# Patient Record
Sex: MALE | Race: WHITE | NOT HISPANIC OR LATINO | Employment: OTHER | URBAN - METROPOLITAN AREA
[De-identification: names, ages, dates, MRNs, and addresses within clinical notes are randomized per-mention and may not be internally consistent; named-entity substitution may affect disease eponyms.]

---

## 2019-05-07 DIAGNOSIS — I48.91 ATRIAL FIBRILLATION, UNSPECIFIED TYPE (HCC): Primary | ICD-10-CM

## 2019-05-09 ENCOUNTER — HOSPITAL ENCOUNTER (OUTPATIENT)
Dept: CARDIOLOGY | Facility: HOSPITAL | Age: 69
Discharge: HOME | End: 2019-05-09
Attending: INTERNAL MEDICINE | Admitting: INTERNAL MEDICINE

## 2019-05-09 VITALS
DIASTOLIC BLOOD PRESSURE: 81 MMHG | HEART RATE: 80 BPM | OXYGEN SATURATION: 92 % | SYSTOLIC BLOOD PRESSURE: 117 MMHG | RESPIRATION RATE: 16 BRPM | TEMPERATURE: 97 F

## 2019-05-09 DIAGNOSIS — I48.91 ATRIAL FIBRILLATION, UNSPECIFIED TYPE (HCC): ICD-10-CM

## 2019-05-09 DIAGNOSIS — I48.3 TYPICAL ATRIAL FLUTTER (HCC): ICD-10-CM

## 2019-05-09 DIAGNOSIS — I48.0 PAROXYSMAL ATRIAL FIBRILLATION (HCC): Primary | ICD-10-CM

## 2019-05-09 PROBLEM — R06.83 SNORING: Status: ACTIVE | Noted: 2019-05-09

## 2019-05-09 PROBLEM — I10 ESSENTIAL HYPERTENSION: Status: ACTIVE | Noted: 2019-05-09

## 2019-05-09 PROBLEM — N40.0 BPH (BENIGN PROSTATIC HYPERPLASIA): Status: ACTIVE | Noted: 2019-05-09

## 2019-05-09 PROBLEM — E78.5 HYPERLIPIDEMIA LDL GOAL <100: Status: ACTIVE | Noted: 2019-05-09

## 2019-05-09 PROBLEM — I48.19 ATRIAL FIBRILLATION, PERSISTENT (HCC): Status: ACTIVE | Noted: 2019-05-09

## 2019-05-09 LAB
ANION GAP SERPL CALCULATED.3IONS-SCNC: 7 MMOL/L
BUN BLD-MCNC: 12 MG/DL (ref 8–23)
BUN/CREAT SERPL: 11.8 (ref 7–25)
CALCIUM SPEC-SCNC: 8.4 MG/DL (ref 8.6–10.5)
CHLORIDE SERPL-SCNC: 105 MMOL/L (ref 98–107)
CO2 SERPL-SCNC: 28 MMOL/L (ref 22–29)
CREAT BLD-MCNC: 1.02 MG/DL (ref 0.76–1.27)
DEPRECATED RDW RBC AUTO: 43.5 FL (ref 37–54)
ERYTHROCYTE [DISTWIDTH] IN BLOOD BY AUTOMATED COUNT: 13.5 % (ref 12.3–15.4)
GFR SERPL CREATININE-BSD FRML MDRD: 72 ML/MIN/1.73
GLUCOSE BLD-MCNC: 139 MG/DL (ref 65–99)
HCT VFR BLD AUTO: 44.7 % (ref 37.5–51)
HGB BLD-MCNC: 14.6 G/DL (ref 13–17.7)
MCH RBC QN AUTO: 28.9 PG (ref 26.6–33)
MCHC RBC AUTO-ENTMCNC: 32.7 G/DL (ref 31.5–35.7)
MCV RBC AUTO: 88.5 FL (ref 79–97)
PLATELET # BLD AUTO: 209 10*3/MM3 (ref 140–450)
PMV BLD AUTO: 10.3 FL (ref 6–12)
POTASSIUM BLD-SCNC: 3.9 MMOL/L (ref 3.5–5.2)
RBC # BLD AUTO: 5.05 10*6/MM3 (ref 4.14–5.8)
SODIUM BLD-SCNC: 140 MMOL/L (ref 136–145)
WBC NRBC COR # BLD: 5.16 10*3/MM3 (ref 3.4–10.8)

## 2019-05-09 PROCEDURE — 85027 COMPLETE CBC AUTOMATED: CPT | Performed by: INTERNAL MEDICINE

## 2019-05-09 PROCEDURE — S0260 H&P FOR SURGERY: HCPCS | Performed by: INTERNAL MEDICINE

## 2019-05-09 PROCEDURE — 93005 ELECTROCARDIOGRAM TRACING: CPT | Performed by: NURSE PRACTITIONER

## 2019-05-09 PROCEDURE — 92960 CARDIOVERSION ELECTRIC EXT: CPT

## 2019-05-09 PROCEDURE — 93005 ELECTROCARDIOGRAM TRACING: CPT | Performed by: INTERNAL MEDICINE

## 2019-05-09 PROCEDURE — 92960 CARDIOVERSION ELECTRIC EXT: CPT | Performed by: INTERNAL MEDICINE

## 2019-05-09 PROCEDURE — 36415 COLL VENOUS BLD VENIPUNCTURE: CPT

## 2019-05-09 PROCEDURE — 80048 BASIC METABOLIC PNL TOTAL CA: CPT | Performed by: INTERNAL MEDICINE

## 2019-05-09 PROCEDURE — 99152 MOD SED SAME PHYS/QHP 5/>YRS: CPT

## 2019-05-09 PROCEDURE — 25010000002 MIDAZOLAM PER 1 MG: Performed by: INTERNAL MEDICINE

## 2019-05-09 RX ORDER — FINASTERIDE 5 MG/1
5 TABLET, FILM COATED ORAL EVERY MORNING
COMMUNITY
End: 2019-08-29

## 2019-05-09 RX ORDER — NALOXONE HYDROCHLORIDE 0.4 MG/ML
INJECTION, SOLUTION INTRAMUSCULAR; INTRAVENOUS; SUBCUTANEOUS
Status: DISCONTINUED
Start: 2019-05-09 | End: 2019-05-09 | Stop reason: WASHOUT

## 2019-05-09 RX ORDER — LOSARTAN POTASSIUM 25 MG/1
25 TABLET ORAL EVERY MORNING
COMMUNITY
End: 2023-01-17 | Stop reason: DRUGHIGH

## 2019-05-09 RX ORDER — MIDAZOLAM HYDROCHLORIDE 1 MG/ML
INJECTION INTRAMUSCULAR; INTRAVENOUS
Status: COMPLETED | OUTPATIENT
Start: 2019-05-09 | End: 2019-05-09

## 2019-05-09 RX ORDER — TAMSULOSIN HYDROCHLORIDE 0.4 MG/1
1 CAPSULE ORAL EVERY MORNING
COMMUNITY
End: 2019-08-29

## 2019-05-09 RX ORDER — FLUMAZENIL 0.1 MG/ML
INJECTION INTRAVENOUS
Status: DISCONTINUED
Start: 2019-05-09 | End: 2019-05-09 | Stop reason: WASHOUT

## 2019-05-09 RX ORDER — FUROSEMIDE 20 MG/1
20 TABLET ORAL EVERY MORNING
COMMUNITY
End: 2021-12-13

## 2019-05-09 RX ORDER — MIDAZOLAM HYDROCHLORIDE 1 MG/ML
INJECTION INTRAMUSCULAR; INTRAVENOUS
Status: DISCONTINUED
Start: 2019-05-09 | End: 2019-05-09 | Stop reason: HOSPADM

## 2019-05-09 RX ORDER — OMEPRAZOLE 20 MG/1
20 CAPSULE, DELAYED RELEASE ORAL DAILY
COMMUNITY
End: 2019-05-23 | Stop reason: SDUPTHER

## 2019-05-09 RX ORDER — SIMVASTATIN 40 MG
40 TABLET ORAL EVERY MORNING
COMMUNITY

## 2019-05-09 RX ORDER — FLECAINIDE ACETATE 100 MG/1
100 TABLET ORAL 2 TIMES DAILY
Status: ON HOLD | COMMUNITY
End: 2019-05-30

## 2019-05-09 RX ORDER — FENTANYL CITRATE 50 UG/ML
INJECTION, SOLUTION INTRAMUSCULAR; INTRAVENOUS
Status: DISCONTINUED
Start: 2019-05-09 | End: 2019-05-09 | Stop reason: WASHOUT

## 2019-05-09 RX ORDER — DILTIAZEM HYDROCHLORIDE 300 MG/1
360 CAPSULE, COATED, EXTENDED RELEASE ORAL DAILY
Status: ON HOLD | COMMUNITY
End: 2019-05-30

## 2019-05-09 RX ADMIN — METHOHEXITAL SODIUM 20 MG: 500 INJECTION, POWDER, LYOPHILIZED, FOR SOLUTION INTRAMUSCULAR; INTRAVENOUS; RECTAL at 11:25

## 2019-05-09 RX ADMIN — METHOHEXITAL SODIUM 30 MG: 500 INJECTION, POWDER, LYOPHILIZED, FOR SOLUTION INTRAMUSCULAR; INTRAVENOUS; RECTAL at 11:23

## 2019-05-09 RX ADMIN — MIDAZOLAM HYDROCHLORIDE 2 MG: 1 INJECTION, SOLUTION INTRAMUSCULAR; INTRAVENOUS at 11:24

## 2019-05-09 NOTE — H&P
[facility] at [facility]  Cardiovascular History and Physical note    Reason for visit: Symptomatic atrial flutter       The patient is a 68-year-old gentleman with a history of paroxysmal atrial fibrillation originally diagnosed in 2017.  The patient previously was followed at [facility] by Dr. Meredith.  He underwent cardioversion sometime near that diagnosis date.  He was seen by EP at [facility] and there was some discussion of pulmonary vein ablation.  The patient has been maintained on flecainide and diltiazem after undergoing a reportedly normal nuclear stress test.  The patient has had symptomatic recurrence of atrial flutter several times.  His symptoms include fatigue and some mild palpitations symptoms in his neck which are not always present.  He states that over the last 3 weeks he has felt the fatigue symptoms and went to see his primary care provider, Dr. Hakan Pena, who diagnosed him with typical atrial flutter.  The patient is open to consideration of escalation of medical therapy to dofetilide and/or pulmonary vein ablation.  He has decided to transfer his cardiology care to [facility].      Past medical and surgical history, social and family history reviewed in EMR.    REVIEW OF SYSTEMS:   H&P ROS reviewed and pertinent CV ROS as noted in HPI.         Vital Sign Min/Max for last 24 hours  Temp  Min: 97 °F (36.1 °C)  Max: 97 °F (36.1 °C)   BP  Min: 110/80  Max: 131/83   Pulse  Min: 71  Max: 84   Resp  Min: 16  Max: 16   SpO2  Min: 92 %  Max: 97 %   No Data Recorded    No intake or output data in the 24 hours ending 05/09/19 1397        Physical Exam   Constitutional: He is oriented to person, place, and time. He appears well-developed and well-nourished.   HENT:   Head: Normocephalic and atraumatic.   Eyes: Pupils are equal, round, and reactive to light. No scleral icterus.   Neck: No JVD present. Carotid bruit is not present. No thyromegaly present.  "  Cardiovascular: Normal rate and regular rhythm. Exam reveals no gallop.   No murmur heard.  Pulmonary/Chest: Effort normal and breath sounds normal.   Abdominal: Soft. He exhibits no distension. There is no hepatosplenomegaly.   Musculoskeletal: He exhibits no edema.   Neurological: He is alert and oriented to person, place, and time.   Skin: Skin is warm and dry.   Psychiatric: He has a normal mood and affect. His behavior is normal.       EKG (5/9/2019): Atrial flutter with a ventricular rate of 67 bpm.  Typical pattern with upgoing flutter waves in V1 and downgoing flutter waves in 3 and aVF.    Lab Review:   Labs reviewed in the electronic medical record.  Pertinent findings include:  Lab Results   Component Value Date    GLUCOSE 139 (H) 05/09/2019    BUN 12 05/09/2019    CREATININE 1.02 05/09/2019    EGFRIFNONA 72 05/09/2019    BCR 11.8 05/09/2019    K 3.9 05/09/2019    CO2 28.0 05/09/2019    CALCIUM 8.4 (L) 05/09/2019    ALBUMIN 4.4 04/15/2015    AST 39 (H) 04/15/2015    ALT 54 (H) 04/15/2015     Lab Results   Component Value Date    WBC 5.16 05/09/2019    HGB 14.6 05/09/2019    HCT 44.7 05/09/2019    MCV 88.5 05/09/2019     05/09/2019     Lab data obtained 5/7/2019: WBC 6.6, hemoglobin 15.1, hematocrit 44.9, platelets 241, TSH 2.54, T4 1.34, BUN 16, creatinine 1.13, sodium 142, potassium 4.2, BNP 13.5       Active Hospital Problems    Diagnosis   • **Typical atrial flutter (CMS/HCC)     · Noted during office visit with Dr. Hakan Pena on 5/7/2019  · CHADS Vasc = 2 (age, HTN)      • Paroxysmal atrial fibrillation (CMS/HCC)     · Diagnosed in 2017  · Status post cardioversion by Dr. Meredith at [facility]  · Nuclear stress (2018): Reportedly normal.  · Chads Vasc = 2 (HTN, Age)  · Rhythm control strategy with flecainide and diltiazem     • Essential hypertension   • Hyperlipidemia LDL goal <100   • BPH (benign prostatic hyperplasia)     · Managed by Dr. Jack  · We will needs a \"saddle procedure\" once cleared " by cardiology  · Reports questionable bladder cancer       68-year-old gentleman with a history of atrial fibrillation who has had 3 weeks of fatigue symptoms and diagnosed with typical atrial flutter with controlled ventricular rate.  The patient has been on Eliquis uninterrupted for >4 weeks.       · Proceed with external cardioversion  · Outpatient referral to Dr. Dean Garrison for consideration of dofetilide versus PVA/isthmus dependent flutter ablation  · PCP will arrange outpatient sleep study    Harrison Aguirre IV, MD  5/9/2019

## 2019-05-21 ENCOUNTER — TELEPHONE (OUTPATIENT)
Dept: CARDIOLOGY | Facility: CLINIC | Age: 69
End: 2019-05-21

## 2019-05-21 NOTE — TELEPHONE ENCOUNTER
I received a call from the patient's primary care physician, Hakan Pena, who states that he saw Ray in the office and he is back in atrial flutter.  After cardioversion last week, he felt well for several days and then his flutter returned and he became symptomatic once again.    He reportedly has a history of atrial fibrillation, but I have not seen this documented.    I contacted the patient today to let him know that he is scheduled to see Dr. Mercedes on the 28th at 8 AM.

## 2019-05-23 ENCOUNTER — TRANSCRIBE ORDERS (OUTPATIENT)
Dept: PULMONOLOGY | Facility: HOSPITAL | Age: 69
End: 2019-05-23

## 2019-05-23 DIAGNOSIS — G47.33 OBSTRUCTIVE SLEEP APNEA (ADULT) (PEDIATRIC): Primary | ICD-10-CM

## 2019-05-23 RX ORDER — OMEPRAZOLE 20 MG/1
20 CAPSULE, DELAYED RELEASE ORAL EVERY MORNING
COMMUNITY

## 2019-05-23 RX ORDER — DILTIAZEM HYDROCHLORIDE 360 MG/1
360 CAPSULE, EXTENDED RELEASE ORAL DAILY
COMMUNITY
End: 2019-05-28

## 2019-05-28 ENCOUNTER — CONSULT (OUTPATIENT)
Dept: CARDIOLOGY | Facility: CLINIC | Age: 69
End: 2019-05-28

## 2019-05-28 ENCOUNTER — APPOINTMENT (OUTPATIENT)
Dept: PREADMISSION TESTING | Facility: HOSPITAL | Age: 69
End: 2019-05-28

## 2019-05-28 VITALS
BODY MASS INDEX: 37.65 KG/M2 | HEIGHT: 72 IN | SYSTOLIC BLOOD PRESSURE: 122 MMHG | DIASTOLIC BLOOD PRESSURE: 80 MMHG | WEIGHT: 278 LBS | HEART RATE: 72 BPM

## 2019-05-28 DIAGNOSIS — I48.3 TYPICAL ATRIAL FLUTTER (HCC): Primary | ICD-10-CM

## 2019-05-28 DIAGNOSIS — I48.3 TYPICAL ATRIAL FLUTTER (HCC): ICD-10-CM

## 2019-05-28 LAB
ALBUMIN SERPL-MCNC: 4.3 G/DL (ref 3.5–5.2)
ALBUMIN/GLOB SERPL: 1.4 G/DL
ALP SERPL-CCNC: 65 U/L (ref 39–117)
ALT SERPL W P-5'-P-CCNC: 17 U/L (ref 1–41)
ANION GAP SERPL CALCULATED.3IONS-SCNC: 10 MMOL/L
AST SERPL-CCNC: 16 U/L (ref 1–40)
BILIRUB SERPL-MCNC: 0.6 MG/DL (ref 0.2–1.2)
BUN BLD-MCNC: 11 MG/DL (ref 8–23)
BUN/CREAT SERPL: 12.4 (ref 7–25)
CALCIUM SPEC-SCNC: 8.9 MG/DL (ref 8.6–10.5)
CHLORIDE SERPL-SCNC: 100 MMOL/L (ref 98–107)
CO2 SERPL-SCNC: 28 MMOL/L (ref 22–29)
CREAT BLD-MCNC: 0.89 MG/DL (ref 0.76–1.27)
DEPRECATED RDW RBC AUTO: 43.5 FL (ref 37–54)
ERYTHROCYTE [DISTWIDTH] IN BLOOD BY AUTOMATED COUNT: 13.6 % (ref 12.3–15.4)
GFR SERPL CREATININE-BSD FRML MDRD: 85 ML/MIN/1.73
GLOBULIN UR ELPH-MCNC: 3.1 GM/DL
GLUCOSE BLD-MCNC: 137 MG/DL (ref 65–99)
HCT VFR BLD AUTO: 46 % (ref 37.5–51)
HGB BLD-MCNC: 15.3 G/DL (ref 13–17.7)
MCH RBC QN AUTO: 29.1 PG (ref 26.6–33)
MCHC RBC AUTO-ENTMCNC: 33.3 G/DL (ref 31.5–35.7)
MCV RBC AUTO: 87.6 FL (ref 79–97)
PLATELET # BLD AUTO: 265 10*3/MM3 (ref 140–450)
PMV BLD AUTO: 9.6 FL (ref 6–12)
POTASSIUM BLD-SCNC: 3.8 MMOL/L (ref 3.5–5.2)
PROT SERPL-MCNC: 7.4 G/DL (ref 6–8.5)
RBC # BLD AUTO: 5.25 10*6/MM3 (ref 4.14–5.8)
SODIUM BLD-SCNC: 138 MMOL/L (ref 136–145)
WBC NRBC COR # BLD: 5.8 10*3/MM3 (ref 3.4–10.8)

## 2019-05-28 PROCEDURE — 36415 COLL VENOUS BLD VENIPUNCTURE: CPT

## 2019-05-28 PROCEDURE — 93000 ELECTROCARDIOGRAM COMPLETE: CPT | Performed by: INTERNAL MEDICINE

## 2019-05-28 PROCEDURE — 99204 OFFICE O/P NEW MOD 45 MIN: CPT | Performed by: INTERNAL MEDICINE

## 2019-05-28 PROCEDURE — 85027 COMPLETE CBC AUTOMATED: CPT | Performed by: INTERNAL MEDICINE

## 2019-05-28 PROCEDURE — 80053 COMPREHEN METABOLIC PANEL: CPT | Performed by: INTERNAL MEDICINE

## 2019-05-28 RX ORDER — GUAIFENESIN 600 MG/1
600 TABLET, EXTENDED RELEASE ORAL 2 TIMES DAILY
COMMUNITY
End: 2021-04-09

## 2019-05-28 RX ORDER — SILDENAFIL CITRATE 20 MG/1
TABLET ORAL EVERY 8 HOURS SCHEDULED
COMMUNITY
End: 2019-05-28

## 2019-05-28 RX ORDER — LOSARTAN POTASSIUM AND HYDROCHLOROTHIAZIDE 12.5; 5 MG/1; MG/1
TABLET ORAL DAILY
COMMUNITY
Start: 2014-01-10 | End: 2019-05-28

## 2019-05-28 RX ORDER — NORTRIPTYLINE HYDROCHLORIDE 10 MG/1
CAPSULE ORAL
COMMUNITY
Start: 2014-01-14 | End: 2019-05-28

## 2019-05-28 RX ORDER — TIZANIDINE 4 MG/1
1 TABLET ORAL 2 TIMES DAILY
COMMUNITY
Start: 2014-08-05 | End: 2019-05-28

## 2019-05-28 RX ORDER — GUAIFENESIN 600 MG/1
1200 TABLET, EXTENDED RELEASE ORAL 2 TIMES DAILY
COMMUNITY
End: 2019-05-28

## 2019-05-28 RX ORDER — MELOXICAM 7.5 MG/1
TABLET ORAL
COMMUNITY
Start: 2014-02-06 | End: 2019-05-28

## 2019-05-28 RX ORDER — CYCLOBENZAPRINE HCL 10 MG
TABLET ORAL EVERY 8 HOURS SCHEDULED
COMMUNITY
End: 2019-05-28

## 2019-05-28 RX ORDER — HYDROCHLOROTHIAZIDE 50 MG/1
TABLET ORAL DAILY
Status: ON HOLD | COMMUNITY
Start: 2014-01-10 | End: 2019-05-30

## 2019-05-28 RX ORDER — AMLODIPINE BESYLATE 10 MG/1
10 TABLET ORAL DAILY
COMMUNITY
Start: 2014-01-10 | End: 2019-05-28

## 2019-05-28 RX ORDER — FLUTICASONE PROPIONATE 50 MCG
2 SPRAY, SUSPENSION (ML) NASAL 2 TIMES DAILY PRN
COMMUNITY
End: 2019-06-11

## 2019-05-28 NOTE — PROGRESS NOTES
Elvin Smith  1950  PCP: Hakan Pena MD    SUBJECTIVE:   Elvin Smith is a 68 y.o. male seen for a consultation visit regarding the following:     Chief Complaint:   Chief Complaint   Patient presents with   • Atrial Fibrillation   • Atrial Flutter          Consultation is requested by Harrison Murcia* for evaluation of Atrial Fibrillation and Atrial Flutter        History:  The patient is a 68-year-old patient with a history of paroxysmal atrial fibrillation originally diagnosed in 2017. He was referred for further A. Fib and flutter management. The patient previously was followed at [facility] by Dr. Meredith.  He underwent cardioversion sometime near that diagnosis date.  He was seen by EP at [facility] and there was some discussion of pulmonary vein ablation.  The patient has been maintained on flecainide and diltiazem after undergoing a reportedly normal nuclear stress test.  The patient has had symptomatic recurrence of atrial flutter several times.  His symptoms include fatigue and some mild palpitations symptoms in his neck which are not always present.    Patient also reports that he is severely short of breath with dyspnea on exertion with minimal exercise.          Cardiac PMH: (Old records have been reviewed and summarized below)  Typical atrial flutter (CMS/HCC)       · Noted during office visit with Dr. Hakan Pena on 5/7/2019  · CHADS Vasc = 2 (age, HTN)      • Paroxysmal atrial fibrillation (CMS/HCC)      · Diagnosed in 2017  · Status post cardioversion by Dr. Meredith at [facility]  · Nuclear stress (2018): Reportedly normal.  · Chads Vasc = 2 (HTN, Age)  · Rhythm control strategy with flecainide and diltiazem            Past Medical History, Past Surgical History, Family history, Social History, and Medications were all reviewed with the patient today and updated as necessary.       Current Outpatient Medications:   •  apixaban (ELIQUIS) 5 MG tablet  tablet, Take 5 mg by mouth Every 12 (Twelve) Hours., Disp: , Rfl:   •  diltiaZEM CD (CARDIZEM CD) 360 MG 24 hr capsule, Take 360 mg by mouth Daily., Disp: , Rfl:   •  finasteride (PROSCAR) 5 MG tablet, Take 5 mg by mouth Daily., Disp: , Rfl:   •  flecainide (TAMBOCOR) 100 MG tablet, Take 100 mg by mouth 2 (Two) Times a Day., Disp: , Rfl:   •  fluticasone (FLONASE) 50 MCG/ACT nasal spray, 2 sprays into the nostril(s) as directed by provider 2 (Two) Times a Day., Disp: , Rfl:   •  furosemide (LASIX) 20 MG tablet, Take 20 mg by mouth 2 (Two) Times a Day., Disp: , Rfl:   •  guaiFENesin (MUCINEX) 600 MG 12 hr tablet, Take 1,200 mg by mouth 2 (Two) Times a Day., Disp: , Rfl:   •  losartan (COZAAR) 25 MG tablet, Take 25 mg by mouth Daily., Disp: , Rfl:   •  omeprazole (priLOSEC) 20 MG capsule, Take 20 mg by mouth Daily., Disp: , Rfl:   •  simvastatin (ZOCOR) 40 MG tablet, Take 40 mg by mouth Every Night., Disp: , Rfl:   •  tamsulosin (FLOMAX) 0.4 MG capsule 24 hr capsule, Take 1 capsule by mouth Every Night., Disp: , Rfl:   •  diltiaZEM CD (CARDIZEM CD) 300 MG 24 hr capsule, Take 360 mg by mouth Daily., Disp: , Rfl:     No Known Allergies      Past Medical History:   Diagnosis Date   • Arrhythmia     atrial fibrillation and flutter   • Cancer (CMS/HCC)     bladder   • History of cardioversion    • Hyperlipidemia    • Hypertension      Past Surgical History:   Procedure Laterality Date   • ANTERIOR CERVICAL FUSION     • CYSTOSCOPY BLADDER BIOPSY     • HERNIA REPAIR       Family History   Problem Relation Age of Onset   • Heart failure Mother    • Cancer Sister    • Alcohol abuse Brother    • No Known Problems Brother      Social History     Tobacco Use   • Smoking status: Former Smoker   • Smokeless tobacco: Never Used   Substance Use Topics   • Alcohol use: Yes     Comment: socially        ROS:    General: no recent weight loss/gain, weakness or fatigue  Skin: no rashes, lumps, or other skin changes  HEENT: no dizziness,  "lightheadedness, or vision changes  Respiratory: no cough or hemoptysis  Cardiovascular: + palpitations, and tachycardia  Gastrointestinal: no black/tarry stools or diarrhea  Urinary: no change in frequency or urgency  Peripheral Vascular: no claudication or leg cramps  Musculoskeletal: no muscle or joint pain/stiffness  Psychiatric: no depression or excessive stress  Neurological: no sensory or motor loss, no syncope  Hematologic: no anemia, easy bruising or bleeding  Endocrine: no thyroid problems, nor heat or cold intolerance       PHYSICAL EXAM:   /80   Pulse 72   Ht 182.9 cm (72\")   Wt 126 kg (278 lb)   BMI 37.70 kg/m²      Wt Readings from Last 5 Encounters:   05/28/19 126 kg (278 lb)   09/02/14 122 kg (268 lb 8 oz)   05/13/14 116 kg (256 lb 9.5 oz)   04/01/14 122 kg (269 lb 0.1 oz)   02/06/14 123 kg (271 lb 0.2 oz)     BP Readings from Last 5 Encounters:   05/28/19 122/80   05/09/19 117/81   09/02/14 (!) 131/93   05/13/14 114/77   04/01/14 (!) 135/95       General-Well Nourished, Well developed  Eyes - PERRLA  Neck- supple, No mass  CV- irregular rate and rhythm, no MRG  Lung- clear bilaterally  Abd- soft, +BS  Musc/skel - Norm strength and range of motion  Skin- warm and dry  Neuro - Alert & Oriented x 3, appropriate mood.    Medical problems and test results were reviewed with the patient today.     Results for orders placed or performed during the hospital encounter of 05/09/19   CBC (No Diff)   Result Value Ref Range    WBC 5.16 3.40 - 10.80 10*3/mm3    RBC 5.05 4.14 - 5.80 10*6/mm3    Hemoglobin 14.6 13.0 - 17.7 g/dL    Hematocrit 44.7 37.5 - 51.0 %    MCV 88.5 79.0 - 97.0 fL    MCH 28.9 26.6 - 33.0 pg    MCHC 32.7 31.5 - 35.7 g/dL    RDW 13.5 12.3 - 15.4 %    RDW-SD 43.5 37.0 - 54.0 fl    MPV 10.3 6.0 - 12.0 fL    Platelets 209 140 - 450 10*3/mm3   Basic Metabolic Panel   Result Value Ref Range    Glucose 139 (H) 65 - 99 mg/dL    BUN 12 8 - 23 mg/dL    Creatinine 1.02 0.76 - 1.27 mg/dL    " Sodium 140 136 - 145 mmol/L    Potassium 3.9 3.5 - 5.2 mmol/L    Chloride 105 98 - 107 mmol/L    CO2 28.0 22.0 - 29.0 mmol/L    Calcium 8.4 (L) 8.6 - 10.5 mg/dL    eGFR Non African Amer 72 >60 mL/min/1.73    BUN/Creatinine Ratio 11.8 7.0 - 25.0    Anion Gap 7.0 mmol/L         No results found for: CHOL, HDL, HDLC, LDL, LDLC, VLDL    EKG:  (EKG/Tracing has been independently visualized by me and summarized below)      ECG 12 Lead  Date/Time: 5/28/2019 8:52 AM  Performed by: Vivek Mercedes MD  Authorized by: Vivek Mercedes MD   Comparison: compared with previous ECG from 5/9/2019  Comparison to previous ECG: Return of A. Flutter  Rhythm: atrial flutter  Rate: normal  BPM: 76    Clinical impression: abnormal EKG            ASSESSMENT and PLAN  1. Atrial Flutter - Appears to be the main arrhythmia that the patient has. Discussed options. We discussed potential Electrophysiology Study with possible ablation.  I described the procedures in detail including risks, alternatives, and benefits.  We also discussed that risks include bleeding, vascular damage, stroke, MI, esophageal damage, cardiac perforation, and even death.  2. Atrial Fibrillation - Reported in 2017 . Monitor for events post ablation.   3. Pre-op for prostate procedure - Will stop Anticoagulation 1 month post ablation. Will be low cardiac risk    Return for after procedure.    1. A. Flutter Ablation -         Vivek Mercedes M.D., F.A.C.C, F.H.R.S.  Cardiology/Electrophysiology  05/28/19  8:53 AM

## 2019-05-30 ENCOUNTER — HOSPITAL ENCOUNTER (OUTPATIENT)
Facility: HOSPITAL | Age: 69
Discharge: HOME | End: 2019-05-31
Attending: INTERNAL MEDICINE | Admitting: INTERNAL MEDICINE

## 2019-05-30 DIAGNOSIS — I48.3 TYPICAL ATRIAL FLUTTER (HCC): ICD-10-CM

## 2019-05-30 PROCEDURE — C1731 CATH, EP, 20 OR MORE ELEC: HCPCS | Performed by: INTERNAL MEDICINE

## 2019-05-30 PROCEDURE — 93621 COMP EP EVL L PAC&REC C SINS: CPT | Performed by: INTERNAL MEDICINE

## 2019-05-30 PROCEDURE — C1894 INTRO/SHEATH, NON-LASER: HCPCS | Performed by: INTERNAL MEDICINE

## 2019-05-30 PROCEDURE — 93010 ELECTROCARDIOGRAM REPORT: CPT | Performed by: INTERNAL MEDICINE

## 2019-05-30 PROCEDURE — C1766 INTRO/SHEATH,STRBLE,NON-PEEL: HCPCS | Performed by: INTERNAL MEDICINE

## 2019-05-30 PROCEDURE — 94660 CPAP INITIATION&MGMT: CPT

## 2019-05-30 PROCEDURE — C1730 CATH, EP, 19 OR FEW ELECT: HCPCS | Performed by: INTERNAL MEDICINE

## 2019-05-30 PROCEDURE — 93609 INTRA-VNTR MAPG TCHYCAR SITE: CPT | Performed by: INTERNAL MEDICINE

## 2019-05-30 PROCEDURE — S0260 H&P FOR SURGERY: HCPCS | Performed by: INTERNAL MEDICINE

## 2019-05-30 PROCEDURE — 93653 COMPRE EP EVAL TX SVT: CPT | Performed by: INTERNAL MEDICINE

## 2019-05-30 PROCEDURE — 25010000002 MIDAZOLAM PER 1 MG: Performed by: INTERNAL MEDICINE

## 2019-05-30 PROCEDURE — 99153 MOD SED SAME PHYS/QHP EA: CPT | Performed by: INTERNAL MEDICINE

## 2019-05-30 PROCEDURE — C1733 CATH, EP, OTHR THAN COOL-TIP: HCPCS | Performed by: INTERNAL MEDICINE

## 2019-05-30 PROCEDURE — 93005 ELECTROCARDIOGRAM TRACING: CPT | Performed by: INTERNAL MEDICINE

## 2019-05-30 PROCEDURE — 99152 MOD SED SAME PHYS/QHP 5/>YRS: CPT | Performed by: INTERNAL MEDICINE

## 2019-05-30 PROCEDURE — 25010000002 FENTANYL CITRATE (PF) 100 MCG/2ML SOLUTION: Performed by: INTERNAL MEDICINE

## 2019-05-30 PROCEDURE — G0378 HOSPITAL OBSERVATION PER HR: HCPCS

## 2019-05-30 RX ORDER — DILTIAZEM HYDROCHLORIDE 180 MG/1
360 CAPSULE, COATED, EXTENDED RELEASE ORAL
Status: DISCONTINUED | OUTPATIENT
Start: 2019-05-31 | End: 2019-05-31 | Stop reason: HOSPADM

## 2019-05-30 RX ORDER — TAMSULOSIN HYDROCHLORIDE 0.4 MG/1
0.4 CAPSULE ORAL EVERY MORNING
Status: DISCONTINUED | OUTPATIENT
Start: 2019-05-31 | End: 2019-05-31 | Stop reason: HOSPADM

## 2019-05-30 RX ORDER — OXYCODONE HYDROCHLORIDE AND ACETAMINOPHEN 5; 325 MG/1; MG/1
1 TABLET ORAL EVERY 4 HOURS PRN
Status: DISCONTINUED | OUTPATIENT
Start: 2019-05-30 | End: 2019-05-31 | Stop reason: HOSPADM

## 2019-05-30 RX ORDER — FINASTERIDE 5 MG/1
5 TABLET, FILM COATED ORAL EVERY MORNING
Status: DISCONTINUED | OUTPATIENT
Start: 2019-05-31 | End: 2019-05-31 | Stop reason: HOSPADM

## 2019-05-30 RX ORDER — ACETAMINOPHEN 650 MG/1
650 SUPPOSITORY RECTAL EVERY 4 HOURS PRN
Status: DISCONTINUED | OUTPATIENT
Start: 2019-05-30 | End: 2019-05-31 | Stop reason: HOSPADM

## 2019-05-30 RX ORDER — ACETAMINOPHEN 160 MG/5ML
650 SOLUTION ORAL EVERY 4 HOURS PRN
Status: DISCONTINUED | OUTPATIENT
Start: 2019-05-30 | End: 2019-05-31 | Stop reason: HOSPADM

## 2019-05-30 RX ORDER — FENTANYL CITRATE 50 UG/ML
INJECTION, SOLUTION INTRAMUSCULAR; INTRAVENOUS AS NEEDED
Status: DISCONTINUED | OUTPATIENT
Start: 2019-05-30 | End: 2019-05-30 | Stop reason: HOSPADM

## 2019-05-30 RX ORDER — DILTIAZEM HYDROCHLORIDE 360 MG/1
360 CAPSULE, EXTENDED RELEASE ORAL EVERY MORNING
Status: ON HOLD | COMMUNITY
End: 2022-02-23

## 2019-05-30 RX ORDER — ONDANSETRON 2 MG/ML
4 INJECTION INTRAMUSCULAR; INTRAVENOUS EVERY 6 HOURS PRN
Status: DISCONTINUED | OUTPATIENT
Start: 2019-05-30 | End: 2019-05-31 | Stop reason: HOSPADM

## 2019-05-30 RX ORDER — ACETAMINOPHEN 325 MG/1
650 TABLET ORAL EVERY 4 HOURS PRN
Status: DISCONTINUED | OUTPATIENT
Start: 2019-05-30 | End: 2019-05-31 | Stop reason: HOSPADM

## 2019-05-30 RX ORDER — IBUPROFEN 400 MG/1
400 TABLET ORAL EVERY 6 HOURS PRN
Status: DISCONTINUED | OUTPATIENT
Start: 2019-05-30 | End: 2019-05-31 | Stop reason: HOSPADM

## 2019-05-30 RX ORDER — SODIUM CHLORIDE 9 MG/ML
INJECTION, SOLUTION INTRAVENOUS CONTINUOUS PRN
Status: COMPLETED | OUTPATIENT
Start: 2019-05-30 | End: 2019-05-30

## 2019-05-30 RX ORDER — LIDOCAINE HYDROCHLORIDE 10 MG/ML
INJECTION, SOLUTION INFILTRATION; PERINEURAL AS NEEDED
Status: DISCONTINUED | OUTPATIENT
Start: 2019-05-30 | End: 2019-05-30 | Stop reason: HOSPADM

## 2019-05-30 RX ORDER — MIDAZOLAM HYDROCHLORIDE 1 MG/ML
INJECTION INTRAMUSCULAR; INTRAVENOUS AS NEEDED
Status: DISCONTINUED | OUTPATIENT
Start: 2019-05-30 | End: 2019-05-30 | Stop reason: HOSPADM

## 2019-05-30 RX ORDER — FUROSEMIDE 20 MG/1
20 TABLET ORAL EVERY MORNING
Status: DISCONTINUED | OUTPATIENT
Start: 2019-05-31 | End: 2019-05-31 | Stop reason: HOSPADM

## 2019-05-30 RX ORDER — LOSARTAN POTASSIUM 25 MG/1
25 TABLET ORAL EVERY MORNING
Status: DISCONTINUED | OUTPATIENT
Start: 2019-05-31 | End: 2019-05-31 | Stop reason: HOSPADM

## 2019-05-31 VITALS
WEIGHT: 281.75 LBS | OXYGEN SATURATION: 1 % | BODY MASS INDEX: 38.16 KG/M2 | TEMPERATURE: 97.8 F | RESPIRATION RATE: 14 BRPM | HEART RATE: 97 BPM | DIASTOLIC BLOOD PRESSURE: 99 MMHG | HEIGHT: 72 IN | SYSTOLIC BLOOD PRESSURE: 142 MMHG

## 2019-05-31 PROCEDURE — G0378 HOSPITAL OBSERVATION PER HR: HCPCS

## 2019-05-31 PROCEDURE — 99217 PR OBSERVATION CARE DISCHARGE MANAGEMENT: CPT | Performed by: INTERNAL MEDICINE

## 2019-05-31 RX ADMIN — ACETAMINOPHEN 650 MG: 325 TABLET, FILM COATED ORAL at 06:19

## 2019-06-06 NOTE — PROGRESS NOTES
Encounter Date:06/11/2019    Patient ID: Elvin Smith is a 68 y.o. male who resides in [place], [place]    CC/Reason for visit:  Atrial Fibrillation and Hypertension           Problem List Items Addressed This Visit        Cardiovascular and Mediastinum    Persistent atrial fibrillation (CMS/HCC)    Overview     · Diagnosed in 2017  · Status post cardioversion by Dr. Meredith at [facility]  · Nuclear stress (2018): Reportedly normal.  · Chads Vasc = 2 (HTN, Age)  · Rhythm control strategy with flecainide and diltiazem         Current Assessment & Plan     · Continue diltiazem 360 mg daily  · Continue Eliquis 5 mg twice daily  · Follow-up with Dr. Vivek Mercedes at already scheduled appointment to discuss options Tikosyn versus ablation         Typical atrial flutter (CMS/HCC) - Primary    Overview     · Noted during office visit with Dr. Hakan Pena on 5/7/2019  · CHADS Vasc = 2 (age, HTN)   · Status post cardioversion for typical flutter, 5/9/2019         Current Assessment & Plan     · Continue Eliquis 5 mg twice daily  · Continue diltiazem 360 mg daily         Essential hypertension    Current Assessment & Plan     · Hypertension is controlled  · Continue diltiazem 360 mg daily  · Continue losartan 25 mg daily         Hyperlipidemia LDL goal <100    Current Assessment & Plan     · Continue Zocor 40 mg daily             The patient has persistent atrial fibrillation.  We discussed restarting his flecainide and setting up for a external cardioversion but he does not wish to have this done.  He refers to remain in atrial fibrillation continuing his anticoagulation with Eliquis and rate control with diltiazem and follow-up with Dr. Mercedes in 2 weeks to discuss possible ablation of his A. fib.  Follow-up with us on an as-needed basis.         · Patient to follow-up with EP concerning options of his persistent atrial fibrillation  · He can follow-up with us on an as-needed basis  Return if symptoms worsen or fail to  molina Smith returns today for follow-up of his paroxysmal atrial fibrillation/typical atrial flutter and cardiac risk factors.  Patient underwent a external cardioversion for atrial flutter early last month that was successful in restoring normal sinus rhythm.  He was referred to Dr. Vivek Mercedes who performed a right sided pulmonary vein ablation of his typical flutter 2 weeks ago.  His flecainide was discontinued at that time as the patient desired to decrease his medications.  He remains chronically anticoagulated with Eliquis.  He has a follow-up appoint with with Dr. Mercedes in a couple of weeks.  EKG performed in our office today shows patient has returned to rate controlled atrial fibrillation.  The patient tells me he feels better than he did before his ablation but still feels fatigued and kind of new he was likely out of rhythm.  His blood pressure is well controlled today.  He denies signs or symptoms of TIA or stroke.    Review of Systems   Constitution: Positive for malaise/fatigue. Negative for weakness.   Eyes: Negative for vision loss in left eye and vision loss in right eye.   Cardiovascular: Negative for chest pain, dyspnea on exertion, near-syncope, orthopnea, palpitations, paroxysmal nocturnal dyspnea and syncope.   Respiratory: Positive for snoring.    Hematologic/Lymphatic: Bruises/bleeds easily.   Musculoskeletal: Negative for myalgias.   Genitourinary: Positive for frequency.   Neurological: Negative for brief paralysis, excessive daytime sleepiness, focal weakness, numbness and paresthesias.   Allergic/Immunologic: Positive for environmental allergies.   All other systems reviewed and are negative.      The patient's past medical, social, family history and ROS reviewed in the patient's electronic medical record.    Allergies  Patient has no known allergies.    Outpatient Medications Marked as Taking for the 6/11/19 encounter (Office Visit) with Rubin  "CHARITY Knight   Medication Sig Dispense Refill   • apixaban (ELIQUIS) 5 MG tablet tablet Take 5 mg by mouth Every 12 (Twelve) Hours.     • diltiaZEM (TIAZAC) 360 MG 24 hr capsule Take 360 mg by mouth Every Morning.     • finasteride (PROSCAR) 5 MG tablet Take 5 mg by mouth Every Morning.     • furosemide (LASIX) 20 MG tablet Take 20 mg by mouth Every Morning.     • guaiFENesin (MUCINEX) 600 MG 12 hr tablet Take 600 mg by mouth 2 (Two) Times a Day.     • losartan (COZAAR) 25 MG tablet Take 25 mg by mouth Every Morning.     • omeprazole (priLOSEC) 20 MG capsule Take 20 mg by mouth Every Morning.     • simvastatin (ZOCOR) 40 MG tablet Take 40 mg by mouth Every Morning.     • tamsulosin (FLOMAX) 0.4 MG capsule 24 hr capsule Take 1 capsule by mouth Every Morning.             Blood pressure 110/70, pulse 78, height 182.9 cm (72\"), weight 129 kg (285 lb).  Body mass index is 38.65 kg/m².  There were no vitals filed for this visit.    Physical Exam   Constitutional: He is oriented to person, place, and time. He appears well-developed and well-nourished.   HENT:   Head: Normocephalic and atraumatic.   Eyes: Pupils are equal, round, and reactive to light. No scleral icterus.   Neck: No JVD present. Carotid bruit is not present. No thyromegaly present.   Cardiovascular: Normal rate and regular rhythm. Exam reveals no gallop.   No murmur heard.  Pulmonary/Chest: Effort normal and breath sounds normal.   Abdominal: Soft. He exhibits no distension. There is no hepatosplenomegaly.   Musculoskeletal: He exhibits no edema.   Neurological: He is alert and oriented to person, place, and time.   Skin: Skin is warm and dry.   Psychiatric: He has a normal mood and affect. His behavior is normal.       Data Review (reviewed with patient):       ECG 12 Lead  Date/Time: 6/11/2019 3:16 PM  Performed by: Antonia Conklin APRN  Authorized by: Antonia Conklin APRN   Comparison: compared with previous ECG from 5/30/2019  Comparison to " previous ECG: Atrial fibrillation has replaced sinus rhythm  Rhythm: atrial fibrillation  BPM: 78    Clinical impression: abnormal EKG  Comments: QT/QTc 410/467 MS  QRS duration 90 MS            Lab Results   Component Value Date    AST 16 05/28/2019    ALT 17 05/28/2019       No results found for: HGBA1C        @TR@  6/11/2019

## 2019-06-11 ENCOUNTER — OFFICE VISIT (OUTPATIENT)
Dept: CARDIOLOGY | Facility: CLINIC | Age: 69
End: 2019-06-11

## 2019-06-11 VITALS
HEIGHT: 72 IN | SYSTOLIC BLOOD PRESSURE: 110 MMHG | DIASTOLIC BLOOD PRESSURE: 70 MMHG | WEIGHT: 285 LBS | BODY MASS INDEX: 38.6 KG/M2 | HEART RATE: 78 BPM

## 2019-06-11 DIAGNOSIS — E78.5 HYPERLIPIDEMIA LDL GOAL <100: ICD-10-CM

## 2019-06-11 DIAGNOSIS — I10 ESSENTIAL HYPERTENSION: ICD-10-CM

## 2019-06-11 DIAGNOSIS — I48.3 TYPICAL ATRIAL FLUTTER (HCC): ICD-10-CM

## 2019-06-11 DIAGNOSIS — I48.19 PERSISTENT ATRIAL FIBRILLATION (HCC): Primary | ICD-10-CM

## 2019-06-11 PROCEDURE — 93000 ELECTROCARDIOGRAM COMPLETE: CPT | Performed by: NURSE PRACTITIONER

## 2019-06-11 PROCEDURE — 99214 OFFICE O/P EST MOD 30 MIN: CPT | Performed by: NURSE PRACTITIONER

## 2019-06-11 NOTE — ASSESSMENT & PLAN NOTE
· Continue diltiazem 360 mg daily  · Continue Eliquis 5 mg twice daily  · Follow-up with Dr. Vivek Mercedes at already scheduled appointment to discuss options Tikosyn versus ablation

## 2019-06-12 ENCOUNTER — HOSPITAL ENCOUNTER (OUTPATIENT)
Dept: SLEEP MEDICINE | Facility: HOSPITAL | Age: 69
Discharge: HOME | End: 2019-06-12
Admitting: INTERNAL MEDICINE

## 2019-06-12 VITALS
HEART RATE: 80 BPM | HEIGHT: 70 IN | BODY MASS INDEX: 40.89 KG/M2 | DIASTOLIC BLOOD PRESSURE: 72 MMHG | WEIGHT: 285.6 LBS | SYSTOLIC BLOOD PRESSURE: 125 MMHG | OXYGEN SATURATION: 96 %

## 2019-06-12 DIAGNOSIS — G47.33 OBSTRUCTIVE SLEEP APNEA (ADULT) (PEDIATRIC): ICD-10-CM

## 2019-06-12 PROCEDURE — 95806 SLEEP STUDY UNATT&RESP EFFT: CPT

## 2019-06-25 ENCOUNTER — OFFICE VISIT (OUTPATIENT)
Dept: CARDIOLOGY | Facility: CLINIC | Age: 69
End: 2019-06-25

## 2019-06-25 VITALS
DIASTOLIC BLOOD PRESSURE: 72 MMHG | HEIGHT: 72 IN | HEART RATE: 74 BPM | BODY MASS INDEX: 38.68 KG/M2 | SYSTOLIC BLOOD PRESSURE: 124 MMHG | OXYGEN SATURATION: 96 % | WEIGHT: 285.6 LBS

## 2019-06-25 DIAGNOSIS — I48.3 TYPICAL ATRIAL FLUTTER (HCC): Primary | ICD-10-CM

## 2019-06-25 DIAGNOSIS — I48.19 PERSISTENT ATRIAL FIBRILLATION (HCC): ICD-10-CM

## 2019-06-25 DIAGNOSIS — Z79.899 LONG TERM CURRENT USE OF ANTIARRHYTHMIC MEDICAL THERAPY: ICD-10-CM

## 2019-06-25 PROCEDURE — 93000 ELECTROCARDIOGRAM COMPLETE: CPT | Performed by: INTERNAL MEDICINE

## 2019-06-25 PROCEDURE — 99214 OFFICE O/P EST MOD 30 MIN: CPT | Performed by: INTERNAL MEDICINE

## 2019-06-25 RX ORDER — FLECAINIDE ACETATE 50 MG/1
50 TABLET ORAL 2 TIMES DAILY
Qty: 60 TABLET | Refills: 11 | Status: SHIPPED | OUTPATIENT
Start: 2019-06-25 | End: 2022-01-18 | Stop reason: DRUGHIGH

## 2019-06-25 NOTE — PROGRESS NOTES
Elvin Smith  1950  PCP: Hakan Pena MD    SUBJECTIVE:   Elvin Smith is a 68 y.o. male seen for a consultation visit regarding the following:     Chief Complaint:   Chief Complaint   Patient presents with   • Atrial Fibrillation        HPI:  Since ablation of A. Flutter, patient has developed A. Fib. Feels SOB and weakness. Found to have severe sleep apnea.     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 He was referred for further A. Fib and flutter management. The patient previously was followed at [facility] by Dr. Meredith.  He underwent cardioversion sometime near that diagnosis date.  He was seen by EP at [facility] and there was some discussion of pulmonary vein ablation.  The patient has been maintained on flecainide and diltiazem after undergoing a reportedly normal nuclear stress test.  The patient has had symptomatic recurrence of atrial flutter several times.  His symptoms include fatigue and some mild palpitations symptoms in his neck which are not always present.    Patient also reports that he is severely short of breath with dyspnea on exertion with minimal exercise.          Cardiac PMH: (Old records have been reviewed and summarized below)  Typical atrial flutter (CMS/HCC)       · Noted during office visit with Dr. Hakan Pena on 5/7/2019  · CHADS Vasc = 2 (age, HTN)      • Paroxysmal atrial fibrillation (CMS/HCC)      · Diagnosed in 2017  · Status post cardioversion by Dr. Meredith at [facility]  · Nuclear stress (2018): Reportedly normal.  · Chads Vasc = 2 (HTN, Age)  · Rhythm control strategy with flecainide and diltiazem            Past Medical History, Past Surgical History, Family history, Social History, and Medications were all reviewed with the patient today and updated as necessary.       Current Outpatient Medications:   •  apixaban (ELIQUIS) 5  MG tablet tablet, Take 5 mg by mouth Every 12 (Twelve) Hours., Disp: , Rfl:   •  diltiaZEM (TIAZAC) 360 MG 24 hr capsule, Take 360 mg by mouth Every Morning., Disp: , Rfl:   •  finasteride (PROSCAR) 5 MG tablet, Take 5 mg by mouth Every Morning., Disp: , Rfl:   •  furosemide (LASIX) 20 MG tablet, Take 20 mg by mouth Every Morning., Disp: , Rfl:   •  guaiFENesin (MUCINEX) 600 MG 12 hr tablet, Take 600 mg by mouth 2 (Two) Times a Day., Disp: , Rfl:   •  losartan (COZAAR) 25 MG tablet, Take 25 mg by mouth Every Morning., Disp: , Rfl:   •  omeprazole (priLOSEC) 20 MG capsule, Take 20 mg by mouth Every Morning., Disp: , Rfl:   •  simvastatin (ZOCOR) 40 MG tablet, Take 40 mg by mouth Every Morning., Disp: , Rfl:   •  tamsulosin (FLOMAX) 0.4 MG capsule 24 hr capsule, Take 1 capsule by mouth Every Morning., Disp: , Rfl:   •  flecainide (TAMBOCOR) 50 MG tablet, Take 1 tablet by mouth 2 (Two) Times a Day., Disp: 60 tablet, Rfl: 11    No Known Allergies      Past Medical History:   Diagnosis Date   • Arrhythmia     atrial fibrillation and flutter   • Borderline diabetes     working with dietician    • Cancer (CMS/[facility]) 05/2019    bladder; surgically removed    • Enlarged prostate     needs surgery once Eliquis can be discontinued    • GERD (gastroesophageal reflux disease)    • History of angioedema 2009    bouts in the last 10 years    • History of atrial fibrillation    • History of cardioversion    • History of MRSA infection 2009    skin; treated with IV antibiotics because having recurring infections for about a year   • Hyperlipidemia    • Hypertension    • Skin cancer     face   • Suspected sleep apnea     seen Dr Hung, awaiting scheduling of sleep study     Past Surgical History:   Procedure Laterality Date   • ANTERIOR CERVICAL FUSION     • CARDIAC ELECTROPHYSIOLOGY PROCEDURE N/A 5/30/2019    Procedure: Ablation atrial flutter;  Surgeon: Vivek Mercedes MD;  Location: [facility] INVASIVE LOCATION;  Service:  "Cardiovascular   • CARDIOVERSION  , 2019    x2   • COLONOSCOPY     • CYSTOSCOPY BLADDER BIOPSY     • EYE SURGERY Bilateral     cataract extraction    • HERNIA REPAIR Bilateral     inguinal    • SKIN BIOPSY      skin cancer removed from face      Family History   Problem Relation Age of Onset   • Heart failure Mother    • Cancer Sister    • Alcohol abuse Brother    • No Known Problems Brother      Social History     Tobacco Use   • Smoking status: Former Smoker     Packs/day: 1.00     Years: 3.00     Pack years: 3.00     Types: Cigarettes     Last attempt to quit: 1979     Years since quittin.5   • Smokeless tobacco: Never Used   Substance Use Topics   • Alcohol use: Yes     Comment: occas          PHYSICAL EXAM:   /72 (BP Location: Left arm, Patient Position: Sitting)   Pulse 74   Ht 182.9 cm (72\")   Wt 130 kg (285 lb 9.6 oz)   SpO2 96%   BMI 38.73 kg/m²      Wt Readings from Last 5 Encounters:   19 130 kg (285 lb 9.6 oz)   19 130 kg (285 lb 9.6 oz)   19 129 kg (285 lb)   19 128 kg (281 lb 12 oz)   19 126 kg (278 lb)     BP Readings from Last 5 Encounters:   19 124/72   19 125/72   19 110/70   19 142/99   19 122/80       General-Well Nourished, Well developed  Eyes - PERRLA  Neck- supple, No mass  CV- irregular rate and rhythm, no MRG  Lung- clear bilaterally  Abd- soft, +BS  Musc/skel - Norm strength and range of motion  Skin- warm and dry  Neuro - Alert & Oriented x 3, appropriate mood.    Medical problems and test results were reviewed with the patient today.     Results for orders placed or performed in visit on 19   CBC (No Diff)   Result Value Ref Range    WBC 5.80 3.40 - 10.80 10*3/mm3    RBC 5.25 4.14 - 5.80 10*6/mm3    Hemoglobin 15.3 13.0 - 17.7 g/dL    Hematocrit 46.0 37.5 - 51.0 %    MCV 87.6 79.0 - 97.0 fL    MCH 29.1 26.6 - 33.0 pg    MCHC 33.3 31.5 - 35.7 g/dL    RDW 13.6 12.3 - 15.4 %    RDW-SD 43.5 37.0 - 54.0 fl "    MPV 9.6 6.0 - 12.0 fL    Platelets 265 140 - 450 10*3/mm3   Comprehensive Metabolic Panel   Result Value Ref Range    Glucose 137 (H) 65 - 99 mg/dL    BUN 11 8 - 23 mg/dL    Creatinine 0.89 0.76 - 1.27 mg/dL    Sodium 138 136 - 145 mmol/L    Potassium 3.8 3.5 - 5.2 mmol/L    Chloride 100 98 - 107 mmol/L    CO2 28.0 22.0 - 29.0 mmol/L    Calcium 8.9 8.6 - 10.5 mg/dL    Total Protein 7.4 6.0 - 8.5 g/dL    Albumin 4.30 3.50 - 5.20 g/dL    ALT (SGPT) 17 1 - 41 U/L    AST (SGOT) 16 1 - 40 U/L    Alkaline Phosphatase 65 39 - 117 U/L    Total Bilirubin 0.6 0.2 - 1.2 mg/dL    eGFR Non African Amer 85 >60 mL/min/1.73    Globulin 3.1 gm/dL    A/G Ratio 1.4 g/dL    BUN/Creatinine Ratio 12.4 7.0 - 25.0    Anion Gap 10.0 mmol/L         No results found for: CHOL, HDL, HDLC, LDL, LDLC, VLDL    EKG:  (EKG/Tracing has been independently visualized by me and summarized below)      ECG 12 Lead  Date/Time: 6/25/2019 4:05 PM  Performed by: Vivek Mercedes MD  Authorized by: Vivek Mercedes MD   Previous ECG: no previous ECG available  Rhythm: atrial fibrillation  Rate: normal  BPM: 74    Clinical impression: abnormal EKG            ASSESSMENT and PLAN  1. Atrial Flutter - Post Ablation  2. Atrial Fibrillation - Reported in 2017. Reoccurred after stopping flecainide.    3. Flecainide use - Monitor EKG  4. Sleep Apnea - Starting CPAP  5. Pre-op for prostate surgery - Hold Eliquis 2 days before    Return in about 4 weeks (around 7/23/2019).          Vivek Mercedes M.D., F.A.C.C, F.H.R.S.  Cardiology/Electrophysiology  06/25/19  4:06 PM

## 2019-08-29 ENCOUNTER — OFFICE VISIT (OUTPATIENT)
Dept: CARDIOLOGY | Facility: CLINIC | Age: 69
End: 2019-08-29

## 2019-08-29 VITALS
SYSTOLIC BLOOD PRESSURE: 138 MMHG | HEART RATE: 80 BPM | WEIGHT: 290.6 LBS | OXYGEN SATURATION: 95 % | DIASTOLIC BLOOD PRESSURE: 82 MMHG | BODY MASS INDEX: 39.36 KG/M2 | HEIGHT: 72 IN

## 2019-08-29 DIAGNOSIS — I48.3 TYPICAL ATRIAL FLUTTER (HCC): Primary | ICD-10-CM

## 2019-08-29 DIAGNOSIS — I48.0 PAROXYSMAL ATRIAL FIBRILLATION (HCC): ICD-10-CM

## 2019-08-29 DIAGNOSIS — Z79.899 LONG TERM CURRENT USE OF ANTIARRHYTHMIC MEDICAL THERAPY: ICD-10-CM

## 2019-08-29 PROCEDURE — 99214 OFFICE O/P EST MOD 30 MIN: CPT | Performed by: INTERNAL MEDICINE

## 2019-08-29 PROCEDURE — 93000 ELECTROCARDIOGRAM COMPLETE: CPT | Performed by: INTERNAL MEDICINE

## 2019-08-29 RX ORDER — AZELASTINE 1 MG/ML
2 SPRAY, METERED NASAL AS NEEDED
COMMUNITY

## 2019-08-29 RX ORDER — MONTELUKAST SODIUM 10 MG/1
10 TABLET ORAL NIGHTLY
COMMUNITY
End: 2021-04-09

## 2019-08-29 NOTE — PROGRESS NOTES
Elvin Smith  1950  PCP: Hakan Pena MD    SUBJECTIVE:   Elvin Smith is a 68 y.o. male seen for a consultation visit regarding the following:     Chief Complaint:   Chief Complaint   Patient presents with   • Atrial Fibrillation        HPI:  Since ablation of A. Flutter, patient has developed A. Fib. Found to have severe sleep apnea, not using CPAP yet. Very tired and fatigue. 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 He was referred for further A. Fib and flutter management. The patient previously was followed at [facility] by Dr. Meredith.  He underwent cardioversion sometime near that diagnosis date.  He was seen by EP at [facility] and there was some discussion of pulmonary vein ablation.  The patient has been maintained on flecainide and diltiazem after undergoing a reportedly normal nuclear stress test.  The patient has had symptomatic recurrence of atrial flutter several times.  His symptoms include fatigue and some mild palpitations symptoms in his neck which are not always present.    Patient also reports that he is severely short of breath with dyspnea on exertion with minimal exercise.          Cardiac PMH: (Old records have been reviewed and summarized below)  Typical atrial flutter (CMS/HCC)       · Noted during office visit with Dr. Hakan Pena on 5/7/2019  · CHADS Vasc = 2 (age, HTN)      • Paroxysmal atrial fibrillation (CMS/HCC)      · Diagnosed in 2017  · Status post cardioversion by Dr. Meredith at [facility]  · Nuclear stress (2018): Reportedly normal.  · Chads Vasc = 2 (HTN, Age)  · Rhythm control strategy with flecainide and diltiazem            Past Medical History, Past Surgical History, Family history, Social History, and Medications were all reviewed with the patient today and updated as necessary.        Current Outpatient Medications:   •  apixaban (ELIQUIS) 5 MG tablet tablet, Take 5 mg by mouth Every 12 (Twelve) Hours., Disp: , Rfl:   •  azelastine (ASTELIN) 0.1 % nasal spray, 2 sprays into the nostril(s) as directed by provider 2 (Two) Times a Day. Use in each nostril as directed, Disp: , Rfl:   •  diltiaZEM (TIAZAC) 360 MG 24 hr capsule, Take 360 mg by mouth Every Morning., Disp: , Rfl:   •  flecainide (TAMBOCOR) 50 MG tablet, Take 1 tablet by mouth 2 (Two) Times a Day. (Patient taking differently: Take 100 mg by mouth 2 (Two) Times a Day.), Disp: 60 tablet, Rfl: 11  •  furosemide (LASIX) 20 MG tablet, Take 20 mg by mouth Every Morning., Disp: , Rfl:   •  guaiFENesin (MUCINEX) 600 MG 12 hr tablet, Take 600 mg by mouth 2 (Two) Times a Day., Disp: , Rfl:   •  losartan (COZAAR) 25 MG tablet, Take 25 mg by mouth Every Morning., Disp: , Rfl:   •  montelukast (SINGULAIR) 10 MG tablet, Take 10 mg by mouth Every Night., Disp: , Rfl:   •  omeprazole (priLOSEC) 20 MG capsule, Take 20 mg by mouth Every Morning., Disp: , Rfl:   •  simvastatin (ZOCOR) 40 MG tablet, Take 40 mg by mouth Every Morning., Disp: , Rfl:     No Known Allergies      Past Medical History:   Diagnosis Date   • Arrhythmia     atrial fibrillation and flutter   • Borderline diabetes     working with dietician    • Cancer (CMS/[facility]) 05/2019    bladder; surgically removed    • Enlarged prostate     needs surgery once Eliquis can be discontinued    • GERD (gastroesophageal reflux disease)    • History of angioedema 2009    bouts in the last 10 years    • History of atrial fibrillation    • History of cardioversion    • History of MRSA infection 2009    skin; treated with IV antibiotics because having recurring infections for about a year   • Hyperlipidemia    • Hypertension    • Skin cancer     face   • Suspected sleep apnea     seen Dr Hung, awaiting scheduling of sleep study     Past Surgical History:   Procedure Laterality Date   • ANTERIOR  "CERVICAL FUSION     • CARDIAC ELECTROPHYSIOLOGY PROCEDURE N/A 2019    Procedure: Ablation atrial flutter;  Surgeon: Vivek Mercedes MD;  Location: [facility] INVASIVE LOCATION;  Service: Cardiovascular   • CARDIOVERSION  , 2019    x2   • COLONOSCOPY     • CYSTOSCOPY BLADDER BIOPSY     • EYE SURGERY Bilateral     cataract extraction    • HERNIA REPAIR Bilateral     inguinal    • SKIN BIOPSY      skin cancer removed from face      Family History   Problem Relation Age of Onset   • Heart failure Mother    • Cancer Sister    • Alcohol abuse Brother    • No Known Problems Brother      Social History     Tobacco Use   • Smoking status: Former Smoker     Packs/day: 1.00     Years: 3.00     Pack years: 3.00     Types: Cigarettes     Last attempt to quit: 1979     Years since quittin.6   • Smokeless tobacco: Never Used   Substance Use Topics   • Alcohol use: Yes     Comment: occas          PHYSICAL EXAM:   /82 (BP Location: Left arm, Patient Position: Sitting)   Pulse 80   Ht 182.9 cm (72\")   Wt 132 kg (290 lb 9.6 oz)   SpO2 95%   BMI 39.41 kg/m²      Wt Readings from Last 5 Encounters:   19 132 kg (290 lb 9.6 oz)   19 130 kg (285 lb 9.6 oz)   19 130 kg (285 lb 9.6 oz)   19 129 kg (285 lb)   19 128 kg (281 lb 12 oz)     BP Readings from Last 5 Encounters:   19 138/82   19 124/72   19 125/72   19 110/70   19 142/99       General-Well Nourished, Well developed  Eyes - PERRLA  Neck- supple, No mass  CV- irregular rate and rhythm, no MRG  Lung- clear bilaterally  Abd- soft, +BS  Musc/skel - Norm strength and range of motion  Skin- warm and dry  Neuro - Alert & Oriented x 3, appropriate mood.    Medical problems and test results were reviewed with the patient today.     Results for orders placed or performed in visit on 19   CBC (No Diff)   Result Value Ref Range    WBC 5.80 3.40 - 10.80 10*3/mm3    RBC 5.25 4.14 - 5.80 10*6/mm3    " Hemoglobin 15.3 13.0 - 17.7 g/dL    Hematocrit 46.0 37.5 - 51.0 %    MCV 87.6 79.0 - 97.0 fL    MCH 29.1 26.6 - 33.0 pg    MCHC 33.3 31.5 - 35.7 g/dL    RDW 13.6 12.3 - 15.4 %    RDW-SD 43.5 37.0 - 54.0 fl    MPV 9.6 6.0 - 12.0 fL    Platelets 265 140 - 450 10*3/mm3   Comprehensive Metabolic Panel   Result Value Ref Range    Glucose 137 (H) 65 - 99 mg/dL    BUN 11 8 - 23 mg/dL    Creatinine 0.89 0.76 - 1.27 mg/dL    Sodium 138 136 - 145 mmol/L    Potassium 3.8 3.5 - 5.2 mmol/L    Chloride 100 98 - 107 mmol/L    CO2 28.0 22.0 - 29.0 mmol/L    Calcium 8.9 8.6 - 10.5 mg/dL    Total Protein 7.4 6.0 - 8.5 g/dL    Albumin 4.30 3.50 - 5.20 g/dL    ALT (SGPT) 17 1 - 41 U/L    AST (SGOT) 16 1 - 40 U/L    Alkaline Phosphatase 65 39 - 117 U/L    Total Bilirubin 0.6 0.2 - 1.2 mg/dL    eGFR Non African Amer 85 >60 mL/min/1.73    Globulin 3.1 gm/dL    A/G Ratio 1.4 g/dL    BUN/Creatinine Ratio 12.4 7.0 - 25.0    Anion Gap 10.0 mmol/L         No results found for: CHOL, HDL, HDLC, LDL, LDLC, VLDL    EKG:  (EKG/Tracing has been independently visualized by me and summarized below)      ECG 12 Lead  Date/Time: 8/29/2019 10:52 AM  Performed by: Vivek Mercedes MD  Authorized by: Vivek Mercedes MD   Comparison: compared with previous ECG   Similar to previous ECG  Rhythm: sinus rhythm  Conduction: 1st degree AV block  T Waves: T waves normal  Other: no other findings    Clinical impression: normal ECG            ASSESSMENT and PLAN  1. Atrial Flutter - Post Ablation  2. Atrial Fibrillation - Reported in 2017. Reoccurred after stopping flecainide.    3. Flecainide use - Monitor EKG  4. Sleep Apnea - Starting CPAP soon, hopefully  5. Pre-op for prostate surgery - Hold Eliquis 2 days before  6. Fatigue/weakness - Most likely secondary to sleep apnea    Return in about 6 months (around 2/29/2020).          Vivek Mercedes M.D., FMARY, F.H.R.S.  Cardiology/Electrophysiology  08/29/19  10:58 AM

## 2020-02-27 ENCOUNTER — TRANSCRIBE ORDERS (OUTPATIENT)
Dept: ADMINISTRATIVE | Facility: HOSPITAL | Age: 70
End: 2020-02-27

## 2020-02-27 DIAGNOSIS — N64.4 BREAST TENDERNESS: Primary | ICD-10-CM

## 2020-02-27 DIAGNOSIS — N64.4 MASTODYNIA OF RIGHT BREAST: ICD-10-CM

## 2020-02-27 DIAGNOSIS — G89.3 NEOPLASM RELATED PAIN: ICD-10-CM

## 2020-02-27 DIAGNOSIS — N64.59 OTHER SIGNS AND SYMPTOMS IN BREAST: Primary | ICD-10-CM

## 2020-03-27 ENCOUNTER — APPOINTMENT (OUTPATIENT)
Dept: MAMMOGRAPHY | Facility: HOSPITAL | Age: 70
End: 2020-03-27

## 2020-05-15 ENCOUNTER — APPOINTMENT (OUTPATIENT)
Dept: MAMMOGRAPHY | Facility: HOSPITAL | Age: 70
End: 2020-05-15

## 2021-04-09 ENCOUNTER — APPOINTMENT (OUTPATIENT)
Dept: PREADMISSION TESTING | Facility: HOSPITAL | Age: 71
End: 2021-04-09

## 2021-04-09 ENCOUNTER — OFFICE VISIT (OUTPATIENT)
Dept: CARDIOLOGY | Facility: CLINIC | Age: 71
End: 2021-04-09

## 2021-04-09 VITALS
HEIGHT: 72 IN | WEIGHT: 299.6 LBS | BODY MASS INDEX: 40.58 KG/M2 | HEART RATE: 80 BPM | SYSTOLIC BLOOD PRESSURE: 116 MMHG | DIASTOLIC BLOOD PRESSURE: 74 MMHG | OXYGEN SATURATION: 94 %

## 2021-04-09 DIAGNOSIS — I48.0 PAROXYSMAL A-FIB (HCC): Primary | ICD-10-CM

## 2021-04-09 LAB — SARS-COV-2 RNA NOSE QL NAA+PROBE: NOT DETECTED

## 2021-04-09 PROCEDURE — 99213 OFFICE O/P EST LOW 20 MIN: CPT | Performed by: PHYSICIAN ASSISTANT

## 2021-04-09 PROCEDURE — 93000 ELECTROCARDIOGRAM COMPLETE: CPT | Performed by: PHYSICIAN ASSISTANT

## 2021-04-09 PROCEDURE — C9803 HOPD COVID-19 SPEC COLLECT: HCPCS

## 2021-04-09 PROCEDURE — U0004 COV-19 TEST NON-CDC HGH THRU: HCPCS

## 2021-04-09 RX ORDER — FLUTICASONE PROPIONATE 50 MCG
2 SPRAY, SUSPENSION (ML) NASAL AS NEEDED
COMMUNITY

## 2021-04-09 NOTE — PROGRESS NOTES
[facility] at [facility]  INITIAL OFFICE CONSULT      Elvin Smith  1950  PCP: Hakan Pena MD    SUBJECTIVE:   Elvin Smith is a 70 y.o. male seen for a consultation visit regarding the following:     Chief Complaint:   Chief Complaint   Patient presents with   • Atrial Fibrillation   • Dizziness   • Shortness of Breath          Consultation is requested by No ref. provider found for evaluation of Atrial Fibrillation, Dizziness, and Shortness of Breath        History:  70 year old returns for follow up regarding PAF, Aflutter, HTN> . The patient was previously seen by Dr. Diehl in 2019 has not been back to us because of the pandemic. He states he has had a difficult year been quite short of breath most of the year. He thinks has been out of rhythm for some time. It is really progressed in nature the past 3 to 4 weeks. He had a basal cell cancer removed from his left ear and ever since then he has felt horrible. He has no chest pain or chest tightness suggesting his pectoris. He is quite short of breath cannot walk very far without having to stop. He denies orthopnea, PND or worsening peripheral edema. He has gained over 19 pounds since the pandemic. He presented to his primary care provider's office on April 8, 2021 and had an EKG that time that revealed atrial fibrillation. He is now referred to our office for reevaluation.      Cardiac PMH: (Old records have been reviewed and summarized below)  1. Aflutter, CTI RFA Dr. Mercedes 5/30/19  2. PAF diagnosed 2017.   a. Remote ECV Dr. Meredith 2017  b. Negative MPS 2018, No ischemia. EF 42%  c. Chadsvasc=2  d. Tambocor, CCB  e. Recurrent symptomatic Afib  3. HTN  4. Obesity  5. KM, Noncompliant with CPAP  6. Basal cell removal 3,4 -weeks ago.       Past Medical History, Past Surgical History, Family history, Social History, and Medications were all reviewed with the patient today and updated as necessary.     Current  Outpatient Medications   Medication Sig Dispense Refill   • apixaban (ELIQUIS) 5 MG tablet tablet Take 5 mg by mouth Every 12 (Twelve) Hours.     • azelastine (ASTELIN) 0.1 % nasal spray 2 sprays into the nostril(s) as directed by provider 2 (Two) Times a Day. Use in each nostril as directed     • diltiaZEM (TIAZAC) 360 MG 24 hr capsule Take 360 mg by mouth Every Morning.     • flecainide (TAMBOCOR) 50 MG tablet Take 1 tablet by mouth 2 (Two) Times a Day. (Patient taking differently: Take 100 mg by mouth 2 (Two) Times a Day.) 60 tablet 11   • fluticasone (FLONASE) 50 MCG/ACT nasal spray 2 sprays into the nostril(s) as directed by provider As Needed for Rhinitis.     • furosemide (LASIX) 20 MG tablet Take 20 mg by mouth Every Morning.     • losartan (COZAAR) 25 MG tablet Take 25 mg by mouth Every Morning.     • omeprazole (priLOSEC) 20 MG capsule Take 20 mg by mouth Every Morning.     • simvastatin (ZOCOR) 40 MG tablet Take 40 mg by mouth Every Morning.       No current facility-administered medications for this visit.     No Known Allergies      Past Medical History:   Diagnosis Date   • Arrhythmia     atrial fibrillation and flutter   • Borderline diabetes     working with dietician    • Cancer (CMS/[facility]) 05/2019    bladder; surgically removed    • Enlarged prostate     needs surgery once Eliquis can be discontinued    • GERD (gastroesophageal reflux disease)    • History of angioedema 2009    bouts in the last 10 years    • History of atrial fibrillation    • History of cardioversion    • History of MRSA infection 2009    skin; treated with IV antibiotics because having recurring infections for about a year   • Hyperlipidemia    • Hypertension    • Skin cancer     face   • Suspected sleep apnea     seen Dr Hung, awaiting scheduling of sleep study     Past Surgical History:   Procedure Laterality Date   • ANTERIOR CERVICAL FUSION     • BASAL CELL CARCINOMA EXCISION  03/2021    left ear   • CARDIAC  "ELECTROPHYSIOLOGY PROCEDURE N/A 2019    Procedure: Ablation atrial flutter;  Surgeon: Vivek Mercedes MD;  Location: [facility] INVASIVE LOCATION;  Service: Cardiovascular   • CARDIOVERSION  , 2019    x2   • COLONOSCOPY     • CYSTOSCOPY BLADDER BIOPSY     • EYE SURGERY Bilateral     cataract extraction    • HERNIA REPAIR Bilateral     inguinal    • SKIN BIOPSY      skin cancer removed from face      Family History   Problem Relation Age of Onset   • Heart failure Mother    • Cancer Sister    • Alcohol abuse Brother    • No Known Problems Brother      Social History     Tobacco Use   • Smoking status: Former Smoker     Packs/day: 1.00     Years: 3.00     Pack years: 3.00     Types: Cigarettes     Quit date:      Years since quittin.2   • Smokeless tobacco: Never Used   Substance Use Topics   • Alcohol use: Yes     Comment: occas       ROS:  Review of Symptoms:  General: + fatigue, weakness  Skin: no rashes, lumps, or other skin changes  HEENT: no dizziness, lightheadedness, or vision changes  Respiratory: no cough or hemoptysis  Cardiovascular: + palpitations, and tachycardia  Gastrointestinal: no black/tarry stools or diarrhea  Urinary: no change in frequency or urgency  Peripheral Vascular: no claudication or leg cramps  Musculoskeletal: no muscle or joint pain/stiffness  Psychiatric: no depression or excessive stress  Neurological: no sensory or motor loss, no syncope  Hematologic: no anemia, easy bruising or bleeding  Endocrine: no thyroid problems, nor heat or cold intolerance         PHYSICAL EXAM:   /74 (BP Location: Right arm, Patient Position: Sitting)   Pulse 80   Ht 182.9 cm (72\")   Wt 136 kg (299 lb 9.6 oz)   SpO2 94%   BMI 40.63 kg/m²      Wt Readings from Last 5 Encounters:   21 136 kg (299 lb 9.6 oz)   19 132 kg (290 lb 9.6 oz)   19 130 kg (285 lb 9.6 oz)   19 130 kg (285 lb 9.6 oz)   19 129 kg (285 lb)     BP Readings from Last 5 Encounters: "   04/09/21 116/74   08/29/19 138/82   06/25/19 124/72   06/12/19 125/72   06/11/19 110/70       General-Well Nourished, Well developed  Eyes - PERRLA  Neck- supple, No mass  CV- IRIR, no MRG  Lung- clear bilaterally  Abd- soft, +BS  Musc/skel - Norm strength and range of motion  Skin- warm and dry  Neuro - Alert & Oriented x 3, appropriate mood.    Patient's external notes were reviewed.  Independent interpretation of test performed by another physician in facility were reviewed.  Outside laboratory data was also reviewed.    Medical problems and test results were reviewed with the patient today.     Results for orders placed or performed in visit on 05/28/19   CBC (No Diff)    Specimen: Blood   Result Value Ref Range    WBC 5.80 3.40 - 10.80 10*3/mm3    RBC 5.25 4.14 - 5.80 10*6/mm3    Hemoglobin 15.3 13.0 - 17.7 g/dL    Hematocrit 46.0 37.5 - 51.0 %    MCV 87.6 79.0 - 97.0 fL    MCH 29.1 26.6 - 33.0 pg    MCHC 33.3 31.5 - 35.7 g/dL    RDW 13.6 12.3 - 15.4 %    RDW-SD 43.5 37.0 - 54.0 fl    MPV 9.6 6.0 - 12.0 fL    Platelets 265 140 - 450 10*3/mm3   Comprehensive Metabolic Panel    Specimen: Blood   Result Value Ref Range    Glucose 137 (H) 65 - 99 mg/dL    BUN 11 8 - 23 mg/dL    Creatinine 0.89 0.76 - 1.27 mg/dL    Sodium 138 136 - 145 mmol/L    Potassium 3.8 3.5 - 5.2 mmol/L    Chloride 100 98 - 107 mmol/L    CO2 28.0 22.0 - 29.0 mmol/L    Calcium 8.9 8.6 - 10.5 mg/dL    Total Protein 7.4 6.0 - 8.5 g/dL    Albumin 4.30 3.50 - 5.20 g/dL    ALT (SGPT) 17 1 - 41 U/L    AST (SGOT) 16 1 - 40 U/L    Alkaline Phosphatase 65 39 - 117 U/L    Total Bilirubin 0.6 0.2 - 1.2 mg/dL    eGFR Non African Amer 85 >60 mL/min/1.73    Globulin 3.1 gm/dL    A/G Ratio 1.4 g/dL    BUN/Creatinine Ratio 12.4 7.0 - 25.0    Anion Gap 10.0 mmol/L         No results found for: CHOL, HDL, HDLC, LDL, LDLC, VLDL    EKG:  (EKG/Tracing has been independently visualized by me and summarized below)      ECG 12 Lead    Date/Time: 4/9/2021 9:44  AM  Performed by: Sebastian Palumbo PA  Authorized by: Sebastian Palumbo PA   Comparison: not compared with previous ECG   Rhythm: atrial fibrillation  Rate: normal  Conduction: conduction normal  ST Segments: ST segments normal  T Waves: T waves normal  QRS axis: normal    Clinical impression: abnormal EKG        Labs;4/8/2021- BNP 98.  . Sodium 139, potassium 401, BUN 13, creatinine 1.09, AST is 42, ALT is 40. TSH 2.0, free T4 1.05 White blood cell 6.6, hemoglobin 15.8, hematocrit 41, platelets 300.    ASSESSMENT   1. Aflutter: CTI RFA 5/19  2. Persistent: Afib 2017-ECV, Flecainide,. NSR on last visit 2019. Recently severe symptoms over three weeks. Afib on EKG 4/8/2021.   3. Flecainide use -Monitor EKG  4. KM- Noncompliant CPAP  5. Anticoagulation: Eliquis 5mg BID      PLAN  ECV. Patient has not missed any Eliquis in past month. We discussed the procedure in great detail including risks, benefits, and alternatives. The patient understands that risks include bleeding, infection, stroke, MI, cardiac perforation, and even death.  Consider increasing flecainide to 100mg BID at time of cardioversion  Echocardiogram  Diet, exercise, weight loss, encourage treatment for CPAP  Return for follow-up 2 months or sooner after cardioversion          Advance Care Planning   ACP discussion was held with the patient during this visit. Patient has an advance directive in EMR which is still valid.    Cardiology/Electrophysiology  04/09/21  09:41 EDT  Will Linwood NAJERA

## 2021-04-09 NOTE — H&P (VIEW-ONLY)
[facility] at [facility]  INITIAL OFFICE CONSULT      Elvin Smith  1950  PCP: Hakan Pena MD    SUBJECTIVE:   Elvin Smith is a 70 y.o. male seen for a consultation visit regarding the following:     Chief Complaint:   Chief Complaint   Patient presents with   • Atrial Fibrillation   • Dizziness   • Shortness of Breath          Consultation is requested by No ref. provider found for evaluation of Atrial Fibrillation, Dizziness, and Shortness of Breath        History:  70 year old returns for follow up regarding PAF, Aflutter, HTN> . The patient was previously seen by Dr. Diehl in 2019 has not been back to us because of the pandemic. He states he has had a difficult year been quite short of breath most of the year. He thinks has been out of rhythm for some time. It is really progressed in nature the past 3 to 4 weeks. He had a basal cell cancer removed from his left ear and ever since then he has felt horrible. He has no chest pain or chest tightness suggesting his pectoris. He is quite short of breath cannot walk very far without having to stop. He denies orthopnea, PND or worsening peripheral edema. He has gained over 19 pounds since the pandemic. He presented to his primary care provider's office on April 8, 2021 and had an EKG that time that revealed atrial fibrillation. He is now referred to our office for reevaluation.      Cardiac PMH: (Old records have been reviewed and summarized below)  1. Aflutter, CTI RFA Dr. Mercedes 5/30/19  2. PAF diagnosed 2017.   a. Remote ECV Dr. Meredith 2017  b. Negative MPS 2018, No ischemia. EF 42%  c. Chadsvasc=2  d. Tambocor, CCB  e. Recurrent symptomatic Afib  3. HTN  4. Obesity  5. KM, Noncompliant with CPAP  6. Basal cell removal 3,4 -weeks ago.       Past Medical History, Past Surgical History, Family history, Social History, and Medications were all reviewed with the patient today and updated as necessary.     Current  Outpatient Medications   Medication Sig Dispense Refill   • apixaban (ELIQUIS) 5 MG tablet tablet Take 5 mg by mouth Every 12 (Twelve) Hours.     • azelastine (ASTELIN) 0.1 % nasal spray 2 sprays into the nostril(s) as directed by provider 2 (Two) Times a Day. Use in each nostril as directed     • diltiaZEM (TIAZAC) 360 MG 24 hr capsule Take 360 mg by mouth Every Morning.     • flecainide (TAMBOCOR) 50 MG tablet Take 1 tablet by mouth 2 (Two) Times a Day. (Patient taking differently: Take 100 mg by mouth 2 (Two) Times a Day.) 60 tablet 11   • fluticasone (FLONASE) 50 MCG/ACT nasal spray 2 sprays into the nostril(s) as directed by provider As Needed for Rhinitis.     • furosemide (LASIX) 20 MG tablet Take 20 mg by mouth Every Morning.     • losartan (COZAAR) 25 MG tablet Take 25 mg by mouth Every Morning.     • omeprazole (priLOSEC) 20 MG capsule Take 20 mg by mouth Every Morning.     • simvastatin (ZOCOR) 40 MG tablet Take 40 mg by mouth Every Morning.       No current facility-administered medications for this visit.     No Known Allergies      Past Medical History:   Diagnosis Date   • Arrhythmia     atrial fibrillation and flutter   • Borderline diabetes     working with dietician    • Cancer (CMS/[facility]) 05/2019    bladder; surgically removed    • Enlarged prostate     needs surgery once Eliquis can be discontinued    • GERD (gastroesophageal reflux disease)    • History of angioedema 2009    bouts in the last 10 years    • History of atrial fibrillation    • History of cardioversion    • History of MRSA infection 2009    skin; treated with IV antibiotics because having recurring infections for about a year   • Hyperlipidemia    • Hypertension    • Skin cancer     face   • Suspected sleep apnea     seen Dr Hung, awaiting scheduling of sleep study     Past Surgical History:   Procedure Laterality Date   • ANTERIOR CERVICAL FUSION     • BASAL CELL CARCINOMA EXCISION  03/2021    left ear   • CARDIAC  "ELECTROPHYSIOLOGY PROCEDURE N/A 2019    Procedure: Ablation atrial flutter;  Surgeon: Vivek Mercedes MD;  Location: [facility] INVASIVE LOCATION;  Service: Cardiovascular   • CARDIOVERSION  , 2019    x2   • COLONOSCOPY     • CYSTOSCOPY BLADDER BIOPSY     • EYE SURGERY Bilateral     cataract extraction    • HERNIA REPAIR Bilateral     inguinal    • SKIN BIOPSY      skin cancer removed from face      Family History   Problem Relation Age of Onset   • Heart failure Mother    • Cancer Sister    • Alcohol abuse Brother    • No Known Problems Brother      Social History     Tobacco Use   • Smoking status: Former Smoker     Packs/day: 1.00     Years: 3.00     Pack years: 3.00     Types: Cigarettes     Quit date:      Years since quittin.2   • Smokeless tobacco: Never Used   Substance Use Topics   • Alcohol use: Yes     Comment: occas       ROS:  Review of Symptoms:  General: + fatigue, weakness  Skin: no rashes, lumps, or other skin changes  HEENT: no dizziness, lightheadedness, or vision changes  Respiratory: no cough or hemoptysis  Cardiovascular: + palpitations, and tachycardia  Gastrointestinal: no black/tarry stools or diarrhea  Urinary: no change in frequency or urgency  Peripheral Vascular: no claudication or leg cramps  Musculoskeletal: no muscle or joint pain/stiffness  Psychiatric: no depression or excessive stress  Neurological: no sensory or motor loss, no syncope  Hematologic: no anemia, easy bruising or bleeding  Endocrine: no thyroid problems, nor heat or cold intolerance         PHYSICAL EXAM:   /74 (BP Location: Right arm, Patient Position: Sitting)   Pulse 80   Ht 182.9 cm (72\")   Wt 136 kg (299 lb 9.6 oz)   SpO2 94%   BMI 40.63 kg/m²      Wt Readings from Last 5 Encounters:   21 136 kg (299 lb 9.6 oz)   19 132 kg (290 lb 9.6 oz)   19 130 kg (285 lb 9.6 oz)   19 130 kg (285 lb 9.6 oz)   19 129 kg (285 lb)     BP Readings from Last 5 Encounters: "   04/09/21 116/74   08/29/19 138/82   06/25/19 124/72   06/12/19 125/72   06/11/19 110/70       General-Well Nourished, Well developed  Eyes - PERRLA  Neck- supple, No mass  CV- IRIR, no MRG  Lung- clear bilaterally  Abd- soft, +BS  Musc/skel - Norm strength and range of motion  Skin- warm and dry  Neuro - Alert & Oriented x 3, appropriate mood.    Patient's external notes were reviewed.  Independent interpretation of test performed by another physician in facility were reviewed.  Outside laboratory data was also reviewed.    Medical problems and test results were reviewed with the patient today.     Results for orders placed or performed in visit on 05/28/19   CBC (No Diff)    Specimen: Blood   Result Value Ref Range    WBC 5.80 3.40 - 10.80 10*3/mm3    RBC 5.25 4.14 - 5.80 10*6/mm3    Hemoglobin 15.3 13.0 - 17.7 g/dL    Hematocrit 46.0 37.5 - 51.0 %    MCV 87.6 79.0 - 97.0 fL    MCH 29.1 26.6 - 33.0 pg    MCHC 33.3 31.5 - 35.7 g/dL    RDW 13.6 12.3 - 15.4 %    RDW-SD 43.5 37.0 - 54.0 fl    MPV 9.6 6.0 - 12.0 fL    Platelets 265 140 - 450 10*3/mm3   Comprehensive Metabolic Panel    Specimen: Blood   Result Value Ref Range    Glucose 137 (H) 65 - 99 mg/dL    BUN 11 8 - 23 mg/dL    Creatinine 0.89 0.76 - 1.27 mg/dL    Sodium 138 136 - 145 mmol/L    Potassium 3.8 3.5 - 5.2 mmol/L    Chloride 100 98 - 107 mmol/L    CO2 28.0 22.0 - 29.0 mmol/L    Calcium 8.9 8.6 - 10.5 mg/dL    Total Protein 7.4 6.0 - 8.5 g/dL    Albumin 4.30 3.50 - 5.20 g/dL    ALT (SGPT) 17 1 - 41 U/L    AST (SGOT) 16 1 - 40 U/L    Alkaline Phosphatase 65 39 - 117 U/L    Total Bilirubin 0.6 0.2 - 1.2 mg/dL    eGFR Non African Amer 85 >60 mL/min/1.73    Globulin 3.1 gm/dL    A/G Ratio 1.4 g/dL    BUN/Creatinine Ratio 12.4 7.0 - 25.0    Anion Gap 10.0 mmol/L         No results found for: CHOL, HDL, HDLC, LDL, LDLC, VLDL    EKG:  (EKG/Tracing has been independently visualized by me and summarized below)      ECG 12 Lead    Date/Time: 4/9/2021 9:44  AM  Performed by: Sebastian Palumbo PA  Authorized by: Sebastian Palumbo PA   Comparison: not compared with previous ECG   Rhythm: atrial fibrillation  Rate: normal  Conduction: conduction normal  ST Segments: ST segments normal  T Waves: T waves normal  QRS axis: normal    Clinical impression: abnormal EKG        Labs;4/8/2021- BNP 98.  . Sodium 139, potassium 401, BUN 13, creatinine 1.09, AST is 42, ALT is 40. TSH 2.0, free T4 1.05 White blood cell 6.6, hemoglobin 15.8, hematocrit 41, platelets 300.    ASSESSMENT   1. Aflutter: CTI RFA 5/19  2. Persistent: Afib 2017-ECV, Flecainide,. NSR on last visit 2019. Recently severe symptoms over three weeks. Afib on EKG 4/8/2021.   3. Flecainide use -Monitor EKG  4. KM- Noncompliant CPAP  5. Anticoagulation: Eliquis 5mg BID      PLAN  ECV. Patient has not missed any Eliquis in past month. We discussed the procedure in great detail including risks, benefits, and alternatives. The patient understands that risks include bleeding, infection, stroke, MI, cardiac perforation, and even death.  Consider increasing flecainide to 100mg BID at time of cardioversion  Echocardiogram  Diet, exercise, weight loss, encourage treatment for CPAP  Return for follow-up 2 months or sooner after cardioversion          Advance Care Planning   ACP discussion was held with the patient during this visit. Patient has an advance directive in EMR which is still valid.    Cardiology/Electrophysiology  04/09/21  09:41 EDT  Will Linwood NAJERA

## 2021-04-12 ENCOUNTER — PREP FOR SURGERY (OUTPATIENT)
Dept: OTHER | Facility: HOSPITAL | Age: 71
End: 2021-04-12

## 2021-04-12 ENCOUNTER — HOSPITAL ENCOUNTER (OUTPATIENT)
Dept: CARDIOLOGY | Facility: HOSPITAL | Age: 71
Discharge: HOME | End: 2021-04-12
Attending: INTERNAL MEDICINE | Admitting: INTERNAL MEDICINE

## 2021-04-12 ENCOUNTER — APPOINTMENT (OUTPATIENT)
Dept: CARDIOLOGY | Facility: HOSPITAL | Age: 71
End: 2021-04-12

## 2021-04-12 VITALS
HEART RATE: 80 BPM | BODY MASS INDEX: 40.09 KG/M2 | DIASTOLIC BLOOD PRESSURE: 95 MMHG | TEMPERATURE: 97.4 F | SYSTOLIC BLOOD PRESSURE: 132 MMHG | WEIGHT: 296 LBS | RESPIRATION RATE: 16 BRPM | OXYGEN SATURATION: 94 % | HEIGHT: 72 IN

## 2021-04-12 DIAGNOSIS — I48.19 PERSISTENT ATRIAL FIBRILLATION (HCC): Primary | ICD-10-CM

## 2021-04-12 DIAGNOSIS — I48.19 PERSISTENT ATRIAL FIBRILLATION (HCC): ICD-10-CM

## 2021-04-12 DIAGNOSIS — I48.0 PAROXYSMAL A-FIB (HCC): ICD-10-CM

## 2021-04-12 LAB
ASCENDING AORTA: 3.9 CM
BH CV ECHO MEAS - AO MAX PG (FULL): 9.1 MMHG
BH CV ECHO MEAS - AO MAX PG: 11.5 MMHG
BH CV ECHO MEAS - AO MEAN PG (FULL): 4.7 MMHG
BH CV ECHO MEAS - AO MEAN PG: 6 MMHG
BH CV ECHO MEAS - AO ROOT AREA (BSA CORRECTED): 1.6
BH CV ECHO MEAS - AO ROOT AREA: 12.6 CM^2
BH CV ECHO MEAS - AO ROOT DIAM: 4 CM
BH CV ECHO MEAS - AO V2 MAX: 169.5 CM/SEC
BH CV ECHO MEAS - AO V2 MEAN: 114.2 CM/SEC
BH CV ECHO MEAS - AO V2 VTI: 34.2 CM
BH CV ECHO MEAS - ASC AORTA: 3.9 CM
BH CV ECHO MEAS - AVA(I,A): 1.5 CM^2
BH CV ECHO MEAS - AVA(I,D): 1.5 CM^2
BH CV ECHO MEAS - AVA(V,A): 1.5 CM^2
BH CV ECHO MEAS - AVA(V,D): 1.5 CM^2
BH CV ECHO MEAS - BSA(HAYCOCK): 2.7 M^2
BH CV ECHO MEAS - BSA: 2.5 M^2
BH CV ECHO MEAS - BZI_BMI: 40.1 KILOGRAMS/M^2
BH CV ECHO MEAS - BZI_METRIC_HEIGHT: 182.9 CM
BH CV ECHO MEAS - BZI_METRIC_WEIGHT: 134.3 KG
BH CV ECHO MEAS - EDV(CUBED): 38.9 ML
BH CV ECHO MEAS - EDV(MOD-SP2): 47 ML
BH CV ECHO MEAS - EDV(MOD-SP4): 89 ML
BH CV ECHO MEAS - EDV(TEICH): 47 ML
BH CV ECHO MEAS - EF(CUBED): 65 %
BH CV ECHO MEAS - EF(MOD-BP): 57 %
BH CV ECHO MEAS - EF(MOD-SP2): 51.1 %
BH CV ECHO MEAS - EF(MOD-SP4): 61.8 %
BH CV ECHO MEAS - EF(TEICH): 57.7 %
BH CV ECHO MEAS - ESV(CUBED): 13.6 ML
BH CV ECHO MEAS - ESV(MOD-SP2): 23 ML
BH CV ECHO MEAS - ESV(MOD-SP4): 34 ML
BH CV ECHO MEAS - ESV(TEICH): 19.9 ML
BH CV ECHO MEAS - FS: 29.5 %
BH CV ECHO MEAS - IVS/LVPW: 1.2
BH CV ECHO MEAS - IVSD: 1.1 CM
BH CV ECHO MEAS - LA DIMENSION: 3 CM
BH CV ECHO MEAS - LA/AO: 0.75
BH CV ECHO MEAS - LAD MAJOR: 6.2 CM
BH CV ECHO MEAS - LAT PEAK E' VEL: 12.9 CM/SEC
BH CV ECHO MEAS - LATERAL E/E' RATIO: 4.5
BH CV ECHO MEAS - LV DIASTOLIC VOL/BSA (35-75): 35.4 ML/M^2
BH CV ECHO MEAS - LV IVRT: 0.1 SEC
BH CV ECHO MEAS - LV MASS(C)D: 96.8 GRAMS
BH CV ECHO MEAS - LV MASS(C)DI: 38.5 GRAMS/M^2
BH CV ECHO MEAS - LV MAX PG: 2.4 MMHG
BH CV ECHO MEAS - LV MEAN PG: 1.3 MMHG
BH CV ECHO MEAS - LV SYSTOLIC VOL/BSA (12-30): 13.5 ML/M^2
BH CV ECHO MEAS - LV V1 MAX: 78 CM/SEC
BH CV ECHO MEAS - LV V1 MEAN: 52.2 CM/SEC
BH CV ECHO MEAS - LV V1 VTI: 15.8 CM
BH CV ECHO MEAS - LVIDD: 3.4 CM
BH CV ECHO MEAS - LVIDS: 2.4 CM
BH CV ECHO MEAS - LVLD AP2: 6.9 CM
BH CV ECHO MEAS - LVLD AP4: 7.7 CM
BH CV ECHO MEAS - LVLS AP2: 5.9 CM
BH CV ECHO MEAS - LVLS AP4: 6.5 CM
BH CV ECHO MEAS - LVOT AREA (M): 3.1 CM^2
BH CV ECHO MEAS - LVOT AREA: 3.2 CM^2
BH CV ECHO MEAS - LVOT DIAM: 2 CM
BH CV ECHO MEAS - LVPWD: 0.92 CM
BH CV ECHO MEAS - MED PEAK E' VEL: 6.7 CM/SEC
BH CV ECHO MEAS - MEDIAL E/E' RATIO: 8.7
BH CV ECHO MEAS - MV A MAX VEL: 66.3 CM/SEC
BH CV ECHO MEAS - MV DEC SLOPE: 218.9 CM/SEC^2
BH CV ECHO MEAS - MV DEC TIME: 0.21 SEC
BH CV ECHO MEAS - MV E MAX VEL: 59.2 CM/SEC
BH CV ECHO MEAS - MV E/A: 0.89
BH CV ECHO MEAS - MV MAX PG: 2 MMHG
BH CV ECHO MEAS - MV MEAN PG: 1.2 MMHG
BH CV ECHO MEAS - MV P1/2T MAX VEL: 70.5 CM/SEC
BH CV ECHO MEAS - MV P1/2T: 94.3 MSEC
BH CV ECHO MEAS - MV V2 MAX: 70.1 CM/SEC
BH CV ECHO MEAS - MV V2 MEAN: 53 CM/SEC
BH CV ECHO MEAS - MV V2 VTI: 20.7 CM
BH CV ECHO MEAS - MVA P1/2T LCG: 3.1 CM^2
BH CV ECHO MEAS - MVA(P1/2T): 2.3 CM^2
BH CV ECHO MEAS - MVA(VTI): 2.4 CM^2
BH CV ECHO MEAS - PA ACC SLOPE: 309.2 CM/SEC^2
BH CV ECHO MEAS - PA ACC TIME: 0.18 SEC
BH CV ECHO MEAS - PA MAX PG: 3.9 MMHG
BH CV ECHO MEAS - PA PR(ACCEL): -1.8 MMHG
BH CV ECHO MEAS - PA V2 MAX: 99.1 CM/SEC
BH CV ECHO MEAS - RAP SYSTOLE: 3 MMHG
BH CV ECHO MEAS - SI(AO): 170.5 ML/M^2
BH CV ECHO MEAS - SI(CUBED): 10 ML/M^2
BH CV ECHO MEAS - SI(LVOT): 19.8 ML/M^2
BH CV ECHO MEAS - SI(MOD-SP2): 9.5 ML/M^2
BH CV ECHO MEAS - SI(MOD-SP4): 21.9 ML/M^2
BH CV ECHO MEAS - SI(TEICH): 10.8 ML/M^2
BH CV ECHO MEAS - SV(AO): 429.2 ML
BH CV ECHO MEAS - SV(CUBED): 25.3 ML
BH CV ECHO MEAS - SV(LVOT): 49.9 ML
BH CV ECHO MEAS - SV(MOD-SP2): 24 ML
BH CV ECHO MEAS - SV(MOD-SP4): 55 ML
BH CV ECHO MEAS - SV(TEICH): 27.1 ML
BH CV ECHO MEAS - TAPSE (>1.6): 1.4 CM
BH CV ECHO MEASUREMENTS AVERAGE E/E' RATIO: 6.04
BH CV VAS BP LEFT ARM: NORMAL MMHG
BH CV XLRA - RV BASE: 3.2 CM
BH CV XLRA - RV LENGTH: 6.6 CM
BH CV XLRA - RV MID: 3.1 CM
BH CV XLRA - TDI S': 8.48 CM/SEC
IVRT: 100 MSEC
LEFT ATRIUM VOLUME INDEX: 22.6 ML/M2

## 2021-04-12 PROCEDURE — 93306 TTE W/DOPPLER COMPLETE: CPT | Performed by: INTERNAL MEDICINE

## 2021-04-12 PROCEDURE — 92960 CARDIOVERSION ELECTRIC EXT: CPT

## 2021-04-12 PROCEDURE — 93010 ELECTROCARDIOGRAM REPORT: CPT | Performed by: INTERNAL MEDICINE

## 2021-04-12 PROCEDURE — 93306 TTE W/DOPPLER COMPLETE: CPT

## 2021-04-12 PROCEDURE — 93005 ELECTROCARDIOGRAM TRACING: CPT | Performed by: INTERNAL MEDICINE

## 2021-04-12 RX ORDER — SODIUM CHLORIDE 0.9 % (FLUSH) 0.9 %
3 SYRINGE (ML) INJECTION EVERY 12 HOURS SCHEDULED
Status: CANCELLED | OUTPATIENT
Start: 2021-04-12

## 2021-04-12 RX ORDER — ACETAMINOPHEN 325 MG/1
650 TABLET ORAL EVERY 4 HOURS PRN
Status: CANCELLED | OUTPATIENT
Start: 2021-04-12

## 2021-04-12 RX ORDER — ONDANSETRON 2 MG/ML
4 INJECTION INTRAMUSCULAR; INTRAVENOUS EVERY 6 HOURS PRN
Status: CANCELLED | OUTPATIENT
Start: 2021-04-12

## 2021-04-12 RX ORDER — SODIUM CHLORIDE 0.9 % (FLUSH) 0.9 %
10 SYRINGE (ML) INJECTION AS NEEDED
Status: DISCONTINUED | OUTPATIENT
Start: 2021-04-12 | End: 2021-04-12 | Stop reason: HOSPADM

## 2021-04-12 RX ORDER — ACETAMINOPHEN 325 MG/1
650 TABLET ORAL EVERY 4 HOURS PRN
Status: DISCONTINUED | OUTPATIENT
Start: 2021-04-12 | End: 2021-04-12 | Stop reason: HOSPADM

## 2021-04-12 RX ORDER — SODIUM CHLORIDE 0.9 % (FLUSH) 0.9 %
10 SYRINGE (ML) INJECTION AS NEEDED
Status: CANCELLED | OUTPATIENT
Start: 2021-04-12

## 2021-04-12 RX ORDER — SODIUM CHLORIDE 0.9 % (FLUSH) 0.9 %
3 SYRINGE (ML) INJECTION EVERY 12 HOURS SCHEDULED
Status: DISCONTINUED | OUTPATIENT
Start: 2021-04-12 | End: 2021-04-12 | Stop reason: HOSPADM

## 2021-04-12 RX ORDER — ONDANSETRON 2 MG/ML
4 INJECTION INTRAMUSCULAR; INTRAVENOUS EVERY 6 HOURS PRN
Status: DISCONTINUED | OUTPATIENT
Start: 2021-04-12 | End: 2021-04-12 | Stop reason: HOSPADM

## 2021-04-12 NOTE — INTERVAL H&P NOTE
H&P reviewed. The patient was examined and there are no changes to the H&P.      Patient presents today to undergo ECV only and TTE for paroxysmal atrial fibrillation.  He was seen in the clinic on 4/9/2021 in AFib.  He has not missed any doses of Eliquis.  Upon arrival today, it appears he may be in NSR.  EKG performed to confirm.  NSR, HR 74 bpm, 1st Degree AVB.  He is currently on Flecainide 100mg BID.  EKG was reviewed by Dr. Mago Singh DC Home and have patient follow up in 2 weeks with EKG with Francisco Palumbo PA-C.  Patient states he's still not compliant with CPAP but he has started a low carb diet and intends to follow through.  He is experiencing a lot of nocturia - he has an appt to follow up with Urology regarding this.    At this time, will DC Home in stable condition and make no medication changes.    Antonia Milan PA-C

## 2021-04-12 NOTE — NURSING NOTE
Office (Krystal Walker)  to send letter to patient with his new follow up appoiintment with CEDRICK Schaefer.

## 2021-04-14 LAB
QT INTERVAL: 416 MS
QTC INTERVAL: 461 MS

## 2021-04-27 ENCOUNTER — OFFICE VISIT (OUTPATIENT)
Dept: CARDIOLOGY | Facility: CLINIC | Age: 71
End: 2021-04-27

## 2021-04-27 VITALS
BODY MASS INDEX: 40.36 KG/M2 | WEIGHT: 298 LBS | OXYGEN SATURATION: 95 % | SYSTOLIC BLOOD PRESSURE: 132 MMHG | DIASTOLIC BLOOD PRESSURE: 70 MMHG | HEART RATE: 75 BPM | HEIGHT: 72 IN | TEMPERATURE: 97.7 F

## 2021-04-27 DIAGNOSIS — I48.0 PAROXYSMAL ATRIAL FIBRILLATION (HCC): Primary | ICD-10-CM

## 2021-04-27 PROCEDURE — 93000 ELECTROCARDIOGRAM COMPLETE: CPT | Performed by: PHYSICIAN ASSISTANT

## 2021-04-27 PROCEDURE — 99214 OFFICE O/P EST MOD 30 MIN: CPT | Performed by: PHYSICIAN ASSISTANT

## 2021-04-27 RX ORDER — OXYBUTYNIN CHLORIDE 15 MG/1
15 TABLET, EXTENDED RELEASE ORAL DAILY
COMMUNITY
End: 2021-12-13

## 2021-04-27 NOTE — PROGRESS NOTES
[facility]   OFFICE NOTE      Elvin Smith  1950  PCP: Hakan Pena MD    SUBJECTIVE:   Elvin Smith is a 70 y.o. male seen for a follow up visit regarding the following:     CC:afib    HPI:   Since we last saw the pt, pt denies any AF episodes since he converted to Sinus 4/11/2021. He feels better in NSR and has more energy. He denies any SOB, CP, LH, and dizziness. Denies any hospitalizations, ER visits, bleeding, or TIA/CVA symptoms. Overall feels well.    Cardiac PMH: (Old records have been reviewed and summarized below)  1. Aflutter, CTI RFA Dr. Mercedes 5/30/19  2. PAF diagnosed 2017.   a. Remote ECV Dr. Meredith 2017  b. Negative MPS 2018, No ischemia. EF 42%  c. Chadsvasc=2  d. Tambocor, CCB  e. Recurrent symptomatic Afib- spontenous CV Sinus 4/12/2021  3. HTN  4. Obesity  5. KM, Noncompliant with CPAP  6. Basal cell removal 3,4 -weeks ago.     Past Medical History, Past Surgical History, Family history, Social History, and Medications were all reviewed with the patient today and updated as necessary.       Current Outpatient Medications:   •  apixaban (ELIQUIS) 5 MG tablet tablet, Take 5 mg by mouth Every 12 (Twelve) Hours., Disp: , Rfl:   •  azelastine (ASTELIN) 0.1 % nasal spray, 2 sprays into the nostril(s) as directed by provider As Needed. Use in each nostril as directed , Disp: , Rfl:   •  diltiaZEM (TIAZAC) 360 MG 24 hr capsule, Take 360 mg by mouth Every Morning., Disp: , Rfl:   •  flecainide (TAMBOCOR) 50 MG tablet, Take 1 tablet by mouth 2 (Two) Times a Day. (Patient taking differently: Take 100 mg by mouth 2 (Two) Times a Day.), Disp: 60 tablet, Rfl: 11  •  fluticasone (FLONASE) 50 MCG/ACT nasal spray, 2 sprays into the nostril(s) as directed by provider As Needed for Rhinitis., Disp: , Rfl:   •  furosemide (LASIX) 20 MG tablet, Take 20 mg by mouth Every Morning., Disp: , Rfl:   •  losartan (COZAAR) 25 MG tablet, Take 25 mg by mouth  Every Morning., Disp: , Rfl:   •  omeprazole (priLOSEC) 20 MG capsule, Take 20 mg by mouth Every Morning., Disp: , Rfl:   •  oxybutynin XL (DITROPAN XL) 15 MG 24 hr tablet, 15 mg Daily., Disp: , Rfl:   •  simvastatin (ZOCOR) 40 MG tablet, Take 40 mg by mouth Every Morning., Disp: , Rfl:       No Known Allergies  Patient Active Problem List   Diagnosis   • Persistent atrial fibrillation (CMS/HCC)   • Typical atrial flutter (CMS/HCC)   • Essential hypertension   • Hyperlipidemia LDL goal <100   • BPH (benign prostatic hyperplasia)   • Snoring   • Long term current use of antiarrhythmic medical therapy   • Paroxysmal atrial fibrillation (CMS/HCC)   • Paroxysmal A-fib (CMS/HCC)     Past Medical History:   Diagnosis Date   • Arrhythmia     atrial fibrillation and flutter   • Borderline diabetes     working with dietician    • Cancer (CMS/HCC) 05/2019    bladder; surgically removed    • Enlarged prostate     needs surgery once Eliquis can be discontinued    • GERD (gastroesophageal reflux disease)    • History of angioedema 2009    bouts in the last 10 years    • History of atrial fibrillation    • History of cardioversion    • History of MRSA infection 2009    skin; treated with IV antibiotics because having recurring infections for about a year   • Hyperlipidemia    • Hypertension    • Skin cancer     face   • Suspected sleep apnea     seen Dr Hung, awaiting scheduling of sleep study     Past Surgical History:   Procedure Laterality Date   • ANTERIOR CERVICAL FUSION     • BASAL CELL CARCINOMA EXCISION  03/2021    left ear   • CARDIAC ELECTROPHYSIOLOGY PROCEDURE N/A 5/30/2019    Procedure: Ablation atrial flutter;  Surgeon: Vivek Mercedes MD;  Location: [facility] INVASIVE LOCATION;  Service: Cardiovascular   • CARDIOVERSION  2017, 2019    x2   • COLONOSCOPY     • CYSTOSCOPY BLADDER BIOPSY     • EYE SURGERY Bilateral     cataract extraction    • HERNIA REPAIR Bilateral     inguinal    • SKIN BIOPSY      skin  "cancer removed from face      Family History   Problem Relation Age of Onset   • Heart failure Mother    • Cancer Sister    • Alcohol abuse Brother    • No Known Problems Brother      Social History     Tobacco Use   • Smoking status: Former Smoker     Packs/day: 1.00     Years: 3.00     Pack years: 3.00     Types: Cigarettes     Quit date:      Years since quittin.3   • Smokeless tobacco: Never Used   Substance Use Topics   • Alcohol use: Yes     Comment: occas       ROS:  Review of Symptoms:  General: no recent weight loss/gain, weakness or fatigue  Skin: no rashes, lumps, or other skin changes  HEENT: no dizziness, lightheadedness, or vision changes  Respiratory: no cough or hemoptysis  Cardiovascular: + palpitations, and tachycardia  Gastrointestinal: no black/tarry stools or diarrhea  Urinary: no change in frequency or urgency  Peripheral Vascular: no claudication or leg cramps  Musculoskeletal: no muscle or joint pain/stiffness  Psychiatric: no depression or excessive stress  Neurological: no sensory or motor loss, no syncope  Hematologic: no anemia, easy bruising or bleeding  Endocrine: no thyroid problems, nor heat or cold intolerance    PHYSICAL EXAM:    /70 (BP Location: Right arm, Patient Position: Sitting)   Pulse 75   Temp 97.7 °F (36.5 °C)   Ht 182.9 cm (72\")   Wt 135 kg (298 lb)   SpO2 95%   BMI 40.42 kg/m²        Wt Readings from Last 5 Encounters:   21 135 kg (298 lb)   21 134 kg (296 lb)   21 136 kg (299 lb 9.6 oz)   19 132 kg (290 lb 9.6 oz)   19 130 kg (285 lb 9.6 oz)       BP Readings from Last 5 Encounters:   21 132/70   21 132/95   21 116/74   19 138/82   19 124/72       General appearance - Alert, well appearing, and in no distress   Mental status - Affect appropriate to mood.  Eyes - Sclerae anicteric,  ENMT - Hearing grossly normal bilaterally, Dental hygiene good.  Neck - Carotids upstroke normal bilaterally, " no bruits, no JVD.  Resp - Clear to auscultation, no wheezes, rales or rhonchi, symmetric air entry.  Heart - Normal rate, regular rhythm, normal S1, S2, no murmurs, rubs, clicks or gallops.  GI - Soft, nontender, nondistended, no masses or organomegaly.  Neurological - Grossly intact - normal speech, no focal findings  Musculoskeletal - No joint tenderness, deformity or swelling, no muscular tenderness noted.  Extremities - Peripheral pulses normal, no pedal edema, no clubbing or cyanosis.  Skin - Normal coloration and turgor.  Psych -  oriented to person, place, and time.    Medical problems and test results were reviewed with the patient today.         EKG: (EKG has been independently visualized by me and summarized below)    ECG 12 Lead    Date/Time: 4/27/2021 2:01 PM  Performed by: Sebastian Palumbo PA  Authorized by: Sebastian Palumbo PA   Rhythm: sinus rhythm  Rate: normal  Conduction: conduction normal  ST Segments: ST segments normal  T Waves: T waves normal  QRS axis: normal  Other: no other findings    Clinical impression: normal ECG              ASSESSMENT   1. Persistent Afib: Recurrent Afib last month, scheduled for eCV however he converted on  His own before ECV. He is maintaining NSR and feels better.  2. Flecainide use-Monitor EKg  3. KM: Noncompliant with CPAP  4. Anticoagulation: Eliquis 5mg BID    PLAN  · Continue medical therapy, EKG stable  · Focus on diet and exercise and weight loss.  · Follow up 6 months       4/27/2021  14:05 EDT  Advance Care Planning   ACP discussion was held with the patient during this visit. Patient has an advance directive in EMR which is still valid.    Will Linwood NAJERA

## 2021-10-22 ENCOUNTER — TRANSCRIBE ORDERS (OUTPATIENT)
Dept: ADMINISTRATIVE | Facility: HOSPITAL | Age: 71
End: 2021-10-22

## 2021-10-22 DIAGNOSIS — R06.02 SHORTNESS OF BREATH: Primary | ICD-10-CM

## 2021-11-02 ENCOUNTER — OFFICE VISIT (OUTPATIENT)
Dept: CARDIOLOGY | Facility: CLINIC | Age: 71
End: 2021-11-02

## 2021-11-02 VITALS
OXYGEN SATURATION: 94 % | HEIGHT: 72 IN | WEIGHT: 296 LBS | BODY MASS INDEX: 40.09 KG/M2 | SYSTOLIC BLOOD PRESSURE: 106 MMHG | HEART RATE: 95 BPM | DIASTOLIC BLOOD PRESSURE: 72 MMHG

## 2021-11-02 DIAGNOSIS — I48.0 PAROXYSMAL ATRIAL FIBRILLATION (HCC): Primary | ICD-10-CM

## 2021-11-02 PROCEDURE — 93000 ELECTROCARDIOGRAM COMPLETE: CPT | Performed by: PHYSICIAN ASSISTANT

## 2021-11-02 PROCEDURE — 99214 OFFICE O/P EST MOD 30 MIN: CPT | Performed by: PHYSICIAN ASSISTANT

## 2021-11-02 NOTE — PROGRESS NOTES
"[facility] at [facility]   OFFICE NOTE      Elvin Smith  1950  PCP: Hakan Pena MD    SUBJECTIVE:   Elvin Smith is a 71 y.o. male seen for a follow up visit regarding the following:     CC:afib    HPI:   Since we last saw the pt, continues had difficulty with low energy and feeling fatigue a lot.  He knows a lot of the symptoms are coming from waking up 5-10 times a night to urinate he said difficulty prostate disease he states despite multiple procedures medications this has not improved.  In addition he has suspected sleep apnea is going for sleep study near future.  He has no energy he feels his battery \"is low\".  Overall he feels poorly at this time.  He would like to pursue further treatment regarding his atrial fibrillation he feels a lot of this is his causing his symptoms.    Cardiac PMH: (Old records have been reviewed and summarized below)  1. Aflutter, CTI RFA Dr. Mercedes 5/30/19  2. PAF diagnosed 2017.   a. Remote ECV Dr. Meredith 2017  b. Negative MPS 2018, No ischemia. EF 42%  c. Chadsvasc=2  d. Tambocor, PERI  e. Recurrent symptomatic Afib- spontenous CV Sinus 4/12/2021  f. Repeat echocardiogram 4/12/2021 normal LV function no significant valvular heart disease  g. EKG recurrent Afib.   3. Recent worsening shortness of breath, plan for repeat stress test rule ischemic heart disease ordered per primary care provider  4. HTN  5. Obesity  6. KM, Noncompliant with CPAP  7. Basal cell removal     Past Medical History, Past Surgical History, Family history, Social History, and Medications were all reviewed with the patient today and updated as necessary.       Current Outpatient Medications:   •  apixaban (ELIQUIS) 5 MG tablet tablet, Take 5 mg by mouth Every 12 (Twelve) Hours., Disp: , Rfl:   •  azelastine (ASTELIN) 0.1 % nasal spray, 2 sprays into the nostril(s) as directed by provider As Needed. Use in each nostril as directed , Disp: , Rfl:   •  " diltiaZEM (TIAZAC) 360 MG 24 hr capsule, Take 360 mg by mouth Every Morning., Disp: , Rfl:   •  flecainide (TAMBOCOR) 50 MG tablet, Take 1 tablet by mouth 2 (Two) Times a Day. (Patient taking differently: Take 100 mg by mouth 2 (Two) Times a Day.), Disp: 60 tablet, Rfl: 11  •  fluticasone (FLONASE) 50 MCG/ACT nasal spray, 2 sprays into the nostril(s) as directed by provider As Needed for Rhinitis., Disp: , Rfl:   •  losartan (COZAAR) 25 MG tablet, Take 25 mg by mouth Every Morning., Disp: , Rfl:   •  omeprazole (priLOSEC) 20 MG capsule, Take 20 mg by mouth Every Morning., Disp: , Rfl:   •  simvastatin (ZOCOR) 40 MG tablet, Take 40 mg by mouth Every Morning., Disp: , Rfl:   •  furosemide (LASIX) 20 MG tablet, Take 20 mg by mouth Every Morning., Disp: , Rfl:   •  oxybutynin XL (DITROPAN XL) 15 MG 24 hr tablet, 15 mg Daily., Disp: , Rfl:       No Known Allergies  Patient Active Problem List   Diagnosis   • Persistent atrial fibrillation (HCC)   • Typical atrial flutter (HCC)   • Essential hypertension   • Hyperlipidemia LDL goal <100   • BPH (benign prostatic hyperplasia)   • Snoring   • Long term current use of antiarrhythmic medical therapy   • Paroxysmal atrial fibrillation (HCC)   • Paroxysmal A-fib (HCC)     Past Medical History:   Diagnosis Date   • Arrhythmia     atrial fibrillation and flutter   • Borderline diabetes     working with dietician    • Cancer (HCC) 05/2019    bladder; surgically removed    • Enlarged prostate     needs surgery once Eliquis can be discontinued    • GERD (gastroesophageal reflux disease)    • History of angioedema 2009    bouts in the last 10 years    • History of atrial fibrillation    • History of cardioversion    • History of MRSA infection 2009    skin; treated with IV antibiotics because having recurring infections for about a year   • Hyperlipidemia    • Hypertension    • Skin cancer     face   • Suspected sleep apnea     seen Dr Hung, awaiting scheduling of sleep study  "    Past Surgical History:   Procedure Laterality Date   • ANTERIOR CERVICAL FUSION     • BASAL CELL CARCINOMA EXCISION  2021    left ear   • CARDIAC ELECTROPHYSIOLOGY PROCEDURE N/A 2019    Procedure: Ablation atrial flutter;  Surgeon: Vivek Mercedes MD;  Location: [facility] INVASIVE LOCATION;  Service: Cardiovascular   • CARDIOVERSION  , 2019    x2   • COLONOSCOPY     • CYSTOSCOPY BLADDER BIOPSY     • EYE SURGERY Bilateral     cataract extraction    • HERNIA REPAIR Bilateral     inguinal    • PROSTATE SURGERY     • SKIN BIOPSY      skin cancer removed from face      Family History   Problem Relation Age of Onset   • Heart failure Mother    • Cancer Sister    • Alcohol abuse Brother    • No Known Problems Brother      Social History     Tobacco Use   • Smoking status: Former Smoker     Packs/day: 1.00     Years: 3.00     Pack years: 3.00     Types: Cigarettes     Quit date:      Years since quittin.8   • Smokeless tobacco: Never Used   Substance Use Topics   • Alcohol use: Yes     Comment: occas       ROS:  Review of Symptoms:  General:+fatigue  Skin: no rashes, lumps, or other skin changes  HEENT: no dizziness, lightheadedness, or vision changes  Respiratory: no cough or hemoptysis  Cardiovascular: + palpitations, and tachycardia  Gastrointestinal: no black/tarry stools or diarrhea  Urinary: no change in frequency or urgency  Peripheral Vascular: no claudication or leg cramps  Musculoskeletal: + OA  Psychiatric: no depression or excessive stress  Neurological: no sensory or motor loss, no syncope  Hematologic: no anemia, easy bruising or bleeding  Endocrine: no thyroid problems, nor heat or cold intolerance    PHYSICAL EXAM:    /72 (BP Location: Right arm, Patient Position: Sitting)   Pulse 95   Ht 182.9 cm (72\")   Wt 134 kg (296 lb)   SpO2 94%   BMI 40.14 kg/m²        Wt Readings from Last 5 Encounters:   21 134 kg (296 lb)   21 135 kg (298 lb)   21 134 kg (296 " lb)   04/09/21 136 kg (299 lb 9.6 oz)   08/29/19 132 kg (290 lb 9.6 oz)       BP Readings from Last 5 Encounters:   11/02/21 106/72   04/27/21 132/70   04/12/21 132/95   04/09/21 116/74   08/29/19 138/82       General appearance - Alert, well appearing, and in no distress   Mental status - Affect appropriate to mood.  Eyes - Sclerae anicteric,  ENMT - Hearing grossly normal bilaterally, Dental hygiene good.  Neck - Carotids upstroke normal bilaterally, no bruits, no JVD.  Resp - Clear to auscultation, no wheezes, rales or rhonchi, symmetric air entry.  Heart - IRIR, normal S1, S2, no murmurs, rubs, clicks or gallops.  GI - Soft, nontender, nondistended, no masses or organomegaly.  Neurological - Grossly intact - normal speech, no focal findings  Musculoskeletal - No joint tenderness, deformity or swelling, no muscular tenderness noted.  Extremities - Peripheral pulses normal, no pedal edema, no clubbing or cyanosis.  Skin - Normal coloration and turgor.  Psych -  oriented to person, place, and time.    Medical problems and test results were reviewed with the patient today.         EKG: (EKG has been independently visualized by me and summarized below)    ECG 12 Lead    Date/Time: 11/2/2021 4:03 PM  Performed by: Sebastian Palumbo PA  Authorized by: Sebastian Palumbo PA   Rhythm: atrial fibrillation  Rate: normal  Conduction: conduction normal  QRS axis: normal  Other findings: non-specific ST-T wave changes    Clinical impression: abnormal EKG              ASSESSMENT   1. Persistent Afib: Failed Tambocor therapy  2. Flecainide use-Monitor EKg  3. KM: Noncompliant with CPAP  4. Anticoagulation: Eliquis 5mg BID  5. Obesity>298lbs   6.  Most recent echocardiogram normal LV function    PLAN  · ZIO monitor to determine A. fib burden.  Failed Tambocor therapy. We discussed further options regarding treatment such as another antiarrhythmic like Tikosyn versus pursuing a PVI.  He would like to follow-up with EP  specialist Dr. Clark for further information and opinion.  · We discussed his fatigue issues multifactorial including untreated sleep apnea, obesity, interrupted sleep due to prostate disease.    · He is scheduled for stress test with his primary care provider  · Focus on diet and exercise and weight loss.        11/2/2021  16:02 EDT  Electronically signed by CEDRICK Cooney, 11/02/21, 8:39 AM EDT.  Will Linwood NAJERA

## 2021-11-04 ENCOUNTER — OFFICE VISIT (OUTPATIENT)
Dept: SLEEP MEDICINE | Facility: HOSPITAL | Age: 71
End: 2021-11-04

## 2021-11-04 VITALS
DIASTOLIC BLOOD PRESSURE: 84 MMHG | HEART RATE: 107 BPM | WEIGHT: 297.9 LBS | OXYGEN SATURATION: 95 % | BODY MASS INDEX: 40.35 KG/M2 | HEIGHT: 72 IN | SYSTOLIC BLOOD PRESSURE: 131 MMHG

## 2021-11-04 DIAGNOSIS — I48.0 PAROXYSMAL A-FIB (HCC): ICD-10-CM

## 2021-11-04 DIAGNOSIS — G47.33 OBSTRUCTIVE SLEEP APNEA, ADULT: Primary | ICD-10-CM

## 2021-11-04 DIAGNOSIS — E66.01 MORBID OBESITY (HCC): ICD-10-CM

## 2021-11-04 PROCEDURE — 99203 OFFICE O/P NEW LOW 30 MIN: CPT | Performed by: INTERNAL MEDICINE

## 2021-11-04 RX ORDER — PHENAZOPYRIDINE HYDROCHLORIDE 200 MG/1
200 TABLET, FILM COATED ORAL 3 TIMES DAILY PRN
COMMUNITY
End: 2021-12-13

## 2021-11-12 NOTE — PROGRESS NOTES
"Chief Complaint  Sleeping Problem    Subjective        Elvin Smith presents to [facility] for evaluation of snoring obstructive sleep apnea.  Patient's primary care physician is Dr. Hakan Pena.  He was previously seen by Dr. Hung.  He is seen in person in the sleep clinic.  History of Present Illness  Patient states he had snoring noted \"all his life\".  He has had apneas noted for about 20 years.  He denies awakening gasping for breath.  He is not rested on arising morning.  He denies morning headaches.  He has surgery for snoring in 1986 but continued to have snoring later.  He had a sleep study in June 2019 was diagnosed with sleep apnea and was placed on CPAP but he has not been using it recently.  He says he gets up to the bathroom a lot at night.    He will awaken with a dry mouth.  He denies ever breaking his nose.  He does have trouble breathing through his nose at night.  He had a history of atrial fibrillation on for about 5 years and had ablation.  He will fall asleep if sitting quietly during the day.  He has gotten sleepy driving.  He has a history of reflux symptoms.  He says usually sleeps on his left side    He has been between 9 PM and midnight.  He will fall asleep about 5 minutes.  He awakens once during the night.  He thinks he has about 6 hours of sleep he has had hypertension known for 20 years.  He denies any history of diabetes.  He had a history of afibrillation but denies obstructive coronary disease.    Allergies without    Habits: Smoking: Without    Ethanol: He has 3-4 drinks 2-3 times a week    Caffeine: He has 1 cup of coffee 1 serving of tea and a half of a cola each day.    Medical illnesses he had high blood pressure and atrial fibrillation    Medications:apixaban (ELIQUIS) 5 MG tablet tablet    azelastine (ASTELIN) 0.1 % nasal spray    diltiaZEM (TIAZAC) 360 MG 24 hr capsule    flecainide (TAMBOCOR) 50 MG tablet    fluticasone (FLONASE) " "50 MCG/ACT nasal spray    furosemide (LASIX) 20 MG tablet    losartan (COZAAR) 25 MG tablet    metFORMIN (GLUCOPHAGE) 500 MG tablet    omeprazole (priLOSEC) 20 MG capsule    oxybutynin XL (DITROPAN XL) 15 MG 24 hr tablet    phenazopyridine (PYRIDIUM) 200 MG tablet    simvastatin (ZOCOR) 40 MG tablet      Surgeries had hernia repair cardiac ablation and cataract surgery    Family history: Positive for skin cancer    Review of systems positive for fatigue, congestion, postnasal drip, sinus pressure, sore throat, eye pain, eye redness, apnea, chest tightness, palpitations, flank pain, urinary frequency, decreased p.o., lightheadedness, weakness, easy bruising, decreased concentration, sleep disturbance.  Other systems were reviewed and negative.    [place] score is 23/24  Objective   Vital Signs:   /84   Pulse 107   Ht 182.9 cm (72\")   Wt 135 kg (297 lb 14.4 oz)   SpO2 95%   BMI 40.40 kg/m²     Physical Exam patient appears to be awake and alert.  He does not appear to be in acute respiratory distress.    He is normocephalic.  He has Mallampati class IV anatomy.    Lungs are clear.    Cardiac exam revealed normal S1-S2.    Extremities showed no edema.      Result Review : Sleep study on June 12, 2019 showed an AHI of 43.9.  His weight at that time was 288 pounds.           Assessment and Plan   Diagnoses and all orders for this visit:    1. Obstructive sleep apnea, adult (Primary)  -     Detailed AutoPAP Order    2. Paroxysmal A-fib (HCC)    3. Morbid obesity (HCC)    Patient has a history of snoring obstructive sleep apnea.  He has gained weight since his last study and probably still has significant obstructive sleep apnea.  He is willing to consider going back on therapy.  We have discussed possible therapies including CPAP, weight control, oral appliance, and surgery.  We have discussed the long-term consequences of untreated obstructive sleep apnea.  These include hypertension, diabetes, heart disease, " stroke, and dementia.  After discussion he does wish to go on CPAP and we will place orders for that.  He is encouraged to lose weight.  He is encouraged avoid alcohol and sedatives close to bedtime.  He is encouraged practice lapses in sleep.  We will plan to see him back then in 2 months and download machine make sure it is being effective.  I spent 35 minutes caring for Elvin on this date of service. This time includes time spent by me in the following activities:obtaining and/or reviewing a separately obtained history, performing a medically appropriate examination and/or evaluation , counseling and educating the patient/family/caregiver, ordering medications, tests, or procedures and documenting information in the medical record  Follow Up  No follow-ups on file.  Patient was given instructions and counseling regarding his condition or for health maintenance advice. Please see specific information pulled into the AVS if appropriate.     Stephen Boyd MD [facility]  Sleep Medicine  Pulmonary and Critical Care Medicine

## 2021-11-19 ENCOUNTER — DOCUMENTATION (OUTPATIENT)
Dept: CARDIOLOGY | Facility: CLINIC | Age: 71
End: 2021-11-19

## 2021-11-19 NOTE — PROGRESS NOTES
Attempted to call patient today regarding his ZIO monitor results.  This reveals persistent atrial fib with episodes of tachybradycardia syndrome.  Would recommend stopping Tambocor therapy.  In the office we discussed previously options of further treatment such as Tikosyn versus ablation procedure.  He wanted to discuss this further Dr. Clark next appointment.  We will standby.

## 2021-12-02 ENCOUNTER — TELEPHONE (OUTPATIENT)
Dept: CARDIOLOGY | Facility: CLINIC | Age: 71
End: 2021-12-02

## 2021-12-02 NOTE — TELEPHONE ENCOUNTER
I called the patient and discussed the results of his heart monitor. He would like to see Dr. Clark to discuss his treatment options after he has his stress test on 12/7/21. Is there any way you can work him in? He is very symptomatic. Will wanted him to see Eduardo.

## 2021-12-07 ENCOUNTER — HOSPITAL ENCOUNTER (OUTPATIENT)
Dept: CARDIOLOGY | Facility: HOSPITAL | Age: 71
Discharge: HOME | End: 2021-12-07
Admitting: INTERNAL MEDICINE

## 2021-12-07 VITALS
BODY MASS INDEX: 40.36 KG/M2 | SYSTOLIC BLOOD PRESSURE: 140 MMHG | WEIGHT: 298 LBS | HEART RATE: 104 BPM | HEIGHT: 72 IN | DIASTOLIC BLOOD PRESSURE: 90 MMHG

## 2021-12-07 DIAGNOSIS — R06.02 SHORTNESS OF BREATH: ICD-10-CM

## 2021-12-07 LAB
BH CV REST NUCLEAR ISOTOPE DOSE: 9.4 MCI
BH CV STRESS BP STAGE 2: NORMAL
BH CV STRESS BP STAGE 3: NORMAL
BH CV STRESS COMMENTS STAGE 1: NORMAL
BH CV STRESS DOSE REGADENOSON STAGE 1: 0.4
BH CV STRESS DURATION MIN STAGE 1: 1
BH CV STRESS DURATION MIN STAGE 2: 1
BH CV STRESS DURATION MIN STAGE 3: 1
BH CV STRESS DURATION MIN STAGE 4: 1
BH CV STRESS DURATION SEC STAGE 1: 0
BH CV STRESS DURATION SEC STAGE 2: 0
BH CV STRESS DURATION SEC STAGE 3: 0
BH CV STRESS DURATION SEC STAGE 4: 0
BH CV STRESS HR STAGE 1: 81
BH CV STRESS HR STAGE 2: 107
BH CV STRESS HR STAGE 3: 97
BH CV STRESS HR STAGE 4: 88
BH CV STRESS NUCLEAR ISOTOPE DOSE: 33 MCI
BH CV STRESS O2 STAGE 1: 92
BH CV STRESS O2 STAGE 2: 94
BH CV STRESS O2 STAGE 3: 96
BH CV STRESS O2 STAGE 4: 95
BH CV STRESS PROTOCOL 1: NORMAL
BH CV STRESS RECOVERY BP: NORMAL MMHG
BH CV STRESS RECOVERY HR: 94 BPM
BH CV STRESS RECOVERY O2: 95 %
BH CV STRESS STAGE 1: 1
BH CV STRESS STAGE 2: 2
BH CV STRESS STAGE 3: 3
BH CV STRESS STAGE 4: 4
LV EF NUC BP: 65 %
MAXIMAL PREDICTED HEART RATE: 147 BPM
PERCENT MAX PREDICTED HR: 75.51 %
STRESS BASELINE BP: NORMAL MMHG
STRESS BASELINE HR: 104 BPM
STRESS O2 SAT REST: 95 %
STRESS PERCENT HR: 89 %
STRESS POST ESTIMATED WORKLOAD: 1 METS
STRESS POST EXERCISE DUR MIN: 4 MIN
STRESS POST EXERCISE DUR SEC: 0 SEC
STRESS POST O2 SAT PEAK: 96 %
STRESS POST PEAK BP: NORMAL MMHG
STRESS POST PEAK HR: 111 BPM
STRESS TARGET HR: 125 BPM

## 2021-12-07 PROCEDURE — 78452 HT MUSCLE IMAGE SPECT MULT: CPT

## 2021-12-07 PROCEDURE — 93017 CV STRESS TEST TRACING ONLY: CPT

## 2021-12-07 PROCEDURE — A9500 TC99M SESTAMIBI: HCPCS | Performed by: INTERNAL MEDICINE

## 2021-12-07 PROCEDURE — 78452 HT MUSCLE IMAGE SPECT MULT: CPT | Performed by: INTERNAL MEDICINE

## 2021-12-07 PROCEDURE — 0 TECHNETIUM SESTAMIBI: Performed by: INTERNAL MEDICINE

## 2021-12-07 PROCEDURE — 93018 CV STRESS TEST I&R ONLY: CPT | Performed by: INTERNAL MEDICINE

## 2021-12-07 PROCEDURE — 25010000002 REGADENOSON 0.4 MG/5ML SOLUTION: Performed by: INTERNAL MEDICINE

## 2021-12-07 RX ADMIN — REGADENOSON 0.4 MG: 0.08 INJECTION, SOLUTION INTRAVENOUS at 09:29

## 2021-12-07 RX ADMIN — TECHNETIUM TC 99M SESTAMIBI 1 DOSE: 1 INJECTION INTRAVENOUS at 08:00

## 2021-12-07 RX ADMIN — TECHNETIUM TC 99M SESTAMIBI 1 DOSE: 1 INJECTION INTRAVENOUS at 09:35

## 2021-12-07 NOTE — TELEPHONE ENCOUNTER
Spoke with Mr. Smith this afternoon. We cancelled the appointment for 12-14 with Francisco Palumbo and setup a new appointment with Dr. Clark for 12-13 at 10:30.

## 2021-12-13 ENCOUNTER — OFFICE VISIT (OUTPATIENT)
Dept: CARDIOLOGY | Facility: CLINIC | Age: 71
End: 2021-12-13

## 2021-12-13 VITALS
HEART RATE: 84 BPM | SYSTOLIC BLOOD PRESSURE: 118 MMHG | BODY MASS INDEX: 40.52 KG/M2 | WEIGHT: 299.2 LBS | OXYGEN SATURATION: 92 % | HEIGHT: 72 IN | DIASTOLIC BLOOD PRESSURE: 68 MMHG

## 2021-12-13 DIAGNOSIS — G47.30 SLEEP APNEA IN ADULT: ICD-10-CM

## 2021-12-13 DIAGNOSIS — I48.19 PERSISTENT ATRIAL FIBRILLATION (HCC): Primary | ICD-10-CM

## 2021-12-13 DIAGNOSIS — I10 ESSENTIAL HYPERTENSION: ICD-10-CM

## 2021-12-13 DIAGNOSIS — I48.0 PAROXYSMAL ATRIAL FIBRILLATION (HCC): ICD-10-CM

## 2021-12-13 DIAGNOSIS — I48.3 TYPICAL ATRIAL FLUTTER (HCC): ICD-10-CM

## 2021-12-13 PROCEDURE — 99215 OFFICE O/P EST HI 40 MIN: CPT | Performed by: INTERNAL MEDICINE

## 2021-12-13 RX ORDER — ZOLPIDEM TARTRATE 5 MG/1
TABLET ORAL AS NEEDED
COMMUNITY
Start: 2021-11-16 | End: 2021-12-23

## 2021-12-14 PROBLEM — R06.83 SNORING: Status: RESOLVED | Noted: 2019-05-09 | Resolved: 2021-12-14

## 2021-12-14 PROBLEM — G47.30 SLEEP APNEA IN ADULT: Status: ACTIVE | Noted: 2021-12-14

## 2021-12-14 NOTE — PROGRESS NOTES
Cardiac Electrophysiology Outpatient Follow Up Note            [facility] at [facility]    Follow Up Office Visit      Elvin Smith  [id number]  12/13/2021  [unfilled]  [unfilled]    Primary Care Physician: Hakan Pena MD    Referred By: No ref. provider found    Subjective     Chief Complaint:   Diagnoses and all orders for this visit:    1. Persistent atrial fibrillation (HCC) (Primary)  -     Case Request EP Lab: PVA (persistent), Rythmia, no meds to hold  -     CT Angiogram Chest With & Without Contrast; Future    2. Typical atrial flutter (HCC)    3. Essential hypertension    4. Paroxysmal atrial fibrillation (HCC)    5. Sleep apnea in adult      Chief Complaint   Patient presents with   • Paroxysmal atrial fibrillation       History of Present Illness:   Elvin Smith is a 71 y.o. male who presents to my electrophysiology clinic for follow up of above complaints.  He feels tired.  Ry.  He does not feel like he has any energy.  He is now completely happy with his CPAP machine.  He wears it universally every single night.  Never misses it.    No problem with flecainide but it does not seem to be working anymore.  He can walk uphill very much at all.  In [employer] he has to stop and rest for several minutes.  This correlates with an irregularly irregular pulse which he notes at home.  When he is in normal rhythm he feels much better.      Review of Systems:   Constitutional: No fevers or chills, no recent weight gain or weight loss or fatigue  Eyes: No visual loss, blurred vision, double vision, yellow sclerae.  ENT: No headaches, hearing loss, vertigo, congestion or sore throat.   Cardiovascular: Per HPI  Respiratory: No cough or wheezing, no sputum production, no hematemesis   Gastrointestinal: No abdominal pain, no nausea, vomiting, constipation, diarrhea, melena.   Genitourinary: No dysuria, hematuria or increased frequency.  Musculoskeletal:   No gait disturbance, weakness or joint pain or stiffness  Integumentary: No rashes, urticaria, ulcers or sores.   Neurological: No headache, dizziness, syncope, paralysis, ataxia, no prior CVA/TIA  Psychiatric: No anxiety, or depression  Endocrine: No diaphoresis, cold or heat intolerance. No polyuria or polydipsia.   Hematologic/Lymphatic: No anemia, abnormal bruising or bleeding. No history of DVT/PE.      Past Medical History:   Past Medical History:   Diagnosis Date   • Arrhythmia     atrial fibrillation and flutter   • Borderline diabetes     working with dietician    • Cancer (HCC) 05/2019    bladder; surgically removed    • Enlarged prostate     needs surgery once Eliquis can be discontinued    • GERD (gastroesophageal reflux disease)    • History of angioedema 2009    bouts in the last 10 years    • History of atrial fibrillation    • History of cardioversion    • History of MRSA infection 2009    skin; treated with IV antibiotics because having recurring infections for about a year   • Hyperlipidemia    • Hypertension    • Skin cancer     face   • Suspected sleep apnea     seen Dr Hung, awaiting scheduling of sleep study       Past Surgical History:   Past Surgical History:   Procedure Laterality Date   • ANTERIOR CERVICAL FUSION     • BASAL CELL CARCINOMA EXCISION  03/2021    left ear   • CARDIAC ELECTROPHYSIOLOGY PROCEDURE N/A 5/30/2019    Procedure: Ablation atrial flutter;  Surgeon: Vivek eMrcedes MD;  Location: [facility] INVASIVE LOCATION;  Service: Cardiovascular   • CARDIOVERSION  2017, 2019    x2   • COLONOSCOPY     • CYSTOSCOPY BLADDER BIOPSY     • EYE SURGERY Bilateral     cataract extraction    • HERNIA REPAIR Bilateral     inguinal    • PROSTATE SURGERY     • SKIN BIOPSY      skin cancer removed from face        Family History:   Family History   Problem Relation Age of Onset   • Heart failure Mother    • Cancer Sister    • Alcohol abuse Brother    • No Known Problems Brother   "      Social History:   Social History     Socioeconomic History   • Marital status: Single   Tobacco Use   • Smoking status: Former Smoker     Packs/day: 1.00     Years: 3.00     Pack years: 3.00     Types: Cigarettes     Quit date:      Years since quittin.9   • Smokeless tobacco: Never Used   Substance and Sexual Activity   • Alcohol use: Yes     Comment: occas   • Drug use: No   • Sexual activity: Defer       Medications:     Current Outpatient Medications:   •  apixaban (ELIQUIS) 5 MG tablet tablet, Take 5 mg by mouth Every 12 (Twelve) Hours., Disp: , Rfl:   •  azelastine (ASTELIN) 0.1 % nasal spray, 2 sprays into the nostril(s) as directed by provider As Needed. Use in each nostril as directed , Disp: , Rfl:   •  diltiaZEM (TIAZAC) 360 MG 24 hr capsule, Take 360 mg by mouth Every Morning., Disp: , Rfl:   •  flecainide (TAMBOCOR) 50 MG tablet, Take 1 tablet by mouth 2 (Two) Times a Day. (Patient taking differently: Take 100 mg by mouth As Needed.), Disp: 60 tablet, Rfl: 11  •  fluticasone (FLONASE) 50 MCG/ACT nasal spray, 2 sprays into the nostril(s) as directed by provider As Needed for Rhinitis., Disp: , Rfl:   •  losartan (COZAAR) 25 MG tablet, Take 25 mg by mouth Every Morning., Disp: , Rfl:   •  omeprazole (priLOSEC) 20 MG capsule, Take 20 mg by mouth Every Morning., Disp: , Rfl:   •  simvastatin (ZOCOR) 40 MG tablet, Take 40 mg by mouth Every Morning., Disp: , Rfl:   •  zolpidem (AMBIEN) 5 MG tablet, As Needed., Disp: , Rfl:     Allergies:   No Known Allergies    Objective   Vital Signs:   Vitals:    21 1031   BP: 118/68   BP Location: Left arm   Patient Position: Sitting   Pulse: 84   SpO2: 92%   Weight: 136 kg (299 lb 3.2 oz)   Height: 182.9 cm (72\")       PHYSICAL EXAM  General appearance: Awake, alert, cooperative  Head: Normocephalic, without obvious abnormality, atraumatic  Eyes: Conjunctivae/corneas clear, EOMs intact  Neck: no adenopathy, no carotid bruit, no JVD and thyroid: not " "enlarged  Lungs: clear to auscultation bilaterally and no rhonchi or crackles\", ' symmetric  Heart: Irregularly irregular S1-S2  Abdomen: Soft, non-tender, bowel sounds normal,  no organomegaly  Extremities: extremities normal, atraumatic, no cyanosis or edema  Skin: Skin color, turgor normal, no rashes or lesions  Neurologic: Grossly normal     Lab Results   Component Value Date    GLUCOSE 137 (H) 05/28/2019    CALCIUM 8.9 05/28/2019     05/28/2019    K 3.8 05/28/2019    CO2 28.0 05/28/2019     05/28/2019    BUN 11 05/28/2019    CREATININE 0.89 05/28/2019    EGFRIFNONA 85 05/28/2019    BCR 12.4 05/28/2019    ANIONGAP 10.0 05/28/2019     Lab Results   Component Value Date    WBC 5.80 05/28/2019    HGB 15.3 05/28/2019    HCT 46.0 05/28/2019    MCV 87.6 05/28/2019     05/28/2019     No results found for: INR, PROTIME  No results found for: TSH, P7BZJXX, G3NQUUT, THYROIDAB    Cardiac Testing:     I personally viewed and interpreted the patient's EKG/Telemetry/lab data    Procedures    Tobacco Cessation: N/A  Obstructive Sleep Apnea Screening: Completed    Assessment & Plan    Diagnoses and all orders for this visit:    1. Persistent atrial fibrillation (HCC) (Primary)  -     Case Request EP Lab: PVA (persistent), Rythmia, no meds to hold  -     CT Angiogram Chest With & Without Contrast; Future    2. Typical atrial flutter (HCC)    3. Essential hypertension    4. Paroxysmal atrial fibrillation (HCC)    5. Sleep apnea in adult         Diagnosis Plan   1. Persistent atrial fibrillation (HCC)   persistent A. fib.  Clearly symptomatic.  Does not feel well.  Fatigued short of breath.    Rhythm control strategy is appropriate.  Discussed the option of repeat cardioversion with him increasing antiarrhythmic drug therapy with him potentially amiodarone therapy.    We also discussed the option of catheter ablation procedure to target his A. fib.  We took care to contrast this and compared to his prior " ablation experienced 2019 for typical right atrial flutter.  This procedure would be performed of course under general anesthesia.  The risk profile is 1%.  Complications that we could encounter would be bleeding at the groin cardiac perforation tamponade stroke myocardial infarction death phrenic nerve injury mitral valve injury.  He understands his risk.    He has a strong personal preference to proceed with catheter ablation.    I reviewed the specific risk-benefit profile the procedure.  I quoted the patient a 1 to 2% chance of significant procedure complication including but not limited to bleeding at the groin, cardiac perforation, tamponade, stroke, myocardial infarction, death phrenic nerve injury, pulmonary vein stenosis, catheter entrapment of the mitral valve annulus, esophageal injury as well as fistula and other potential complications.    The patient and I made a mutually shared decision ( shared decision making model ) that the patient would be best served by proceeding with the above invasive heart procedure.  This is a high risk invasive cardiac procedure which comes with significant risk of morbidity and mortality.  This patient has significant underlying  heart disease.  Elvin Smith understands these risks and   has made an informed decision to proceed.      CT Angiogram Chest With & Without Contrast    Rhythmia    Hold flecainide for 3 days    Uninterrupted anticoagulation   2. Typical atrial flutter (HCC)   previously ablated in 2019.  No recurrence.   3. Essential hypertension   blood pressure well controlled.  Doing nicely.   4. Paroxysmal atrial fibrillation (HCC)   progressed to paracystic A. fib.   5. Sleep apnea in adult   CPAP.  Totally compliant.  This served him well.   BMI is 40.  Weight loss encouraged.    I spent 49 minutes in consultation with this patient which included more than 65% of this time in direct face-to-face counseling, physical examination and discussion of my  assessment and findings and shared decision making with the patient.  The remainder of the time not spent face to face was performing one, some or all of the following actions:  preparing to see this patient ( eg. Review of tests),  ordering medications, tests or procedures ), care coordination, discussion of the plan with other healthcare providers, documenting clinical information in Epic well as independently interpreting results and communicating results to patient, family and or caregiver.  All time noted occurred on the date of service.    Follow Up:       Thank you for allowing me to participate in the care of your patient. Please to not hesitate to contact me with additional questions or concerns.      Winston Clark, DO, FACC, RS  Cardiac Electrophysiologist  [facility] / [facility]

## 2021-12-15 ENCOUNTER — PREP FOR SURGERY (OUTPATIENT)
Dept: OTHER | Facility: HOSPITAL | Age: 71
End: 2021-12-15

## 2021-12-15 DIAGNOSIS — I48.19 PERSISTENT ATRIAL FIBRILLATION (HCC): Primary | ICD-10-CM

## 2021-12-15 RX ORDER — CEFAZOLIN SODIUM 2 G/100ML
2 INJECTION, SOLUTION INTRAVENOUS ONCE
Status: CANCELLED | OUTPATIENT
Start: 2021-12-15 | End: 2021-12-15

## 2021-12-15 RX ORDER — NITROGLYCERIN 0.4 MG/1
0.4 TABLET SUBLINGUAL
Status: CANCELLED | OUTPATIENT
Start: 2021-12-15

## 2021-12-15 RX ORDER — ACETAMINOPHEN 325 MG/1
650 TABLET ORAL EVERY 4 HOURS PRN
Status: CANCELLED | OUTPATIENT
Start: 2021-12-15

## 2021-12-15 RX ORDER — ONDANSETRON 2 MG/ML
4 INJECTION INTRAMUSCULAR; INTRAVENOUS EVERY 6 HOURS PRN
Status: CANCELLED | OUTPATIENT
Start: 2021-12-15

## 2021-12-17 DIAGNOSIS — Z01.812 BLOOD TESTS PRIOR TO TREATMENT OR PROCEDURE: Primary | ICD-10-CM

## 2021-12-20 ENCOUNTER — CLINICAL SUPPORT NO REQUIREMENTS (OUTPATIENT)
Dept: PULMONOLOGY | Facility: CLINIC | Age: 71
End: 2021-12-20

## 2021-12-20 DIAGNOSIS — Z01.812 BLOOD TESTS PRIOR TO TREATMENT OR PROCEDURE: ICD-10-CM

## 2021-12-20 PROCEDURE — U0004 COV-19 TEST NON-CDC HGH THRU: HCPCS | Performed by: INTERNAL MEDICINE

## 2021-12-20 PROCEDURE — 99211 OFF/OP EST MAY X REQ PHY/QHP: CPT | Performed by: INTERNAL MEDICINE

## 2021-12-21 LAB — SARS-COV-2 RNA NOSE QL NAA+PROBE: NOT DETECTED

## 2021-12-23 ENCOUNTER — OFFICE VISIT (OUTPATIENT)
Dept: PULMONOLOGY | Facility: CLINIC | Age: 71
End: 2021-12-23

## 2021-12-23 VITALS
TEMPERATURE: 97.3 F | WEIGHT: 303.6 LBS | OXYGEN SATURATION: 96 % | RESPIRATION RATE: 16 BRPM | BODY MASS INDEX: 41.12 KG/M2 | SYSTOLIC BLOOD PRESSURE: 108 MMHG | DIASTOLIC BLOOD PRESSURE: 60 MMHG | HEART RATE: 93 BPM | HEIGHT: 72 IN

## 2021-12-23 DIAGNOSIS — J98.4 RESTRICTIVE LUNG DISEASE: ICD-10-CM

## 2021-12-23 DIAGNOSIS — I48.0 PAROXYSMAL ATRIAL FIBRILLATION (HCC): ICD-10-CM

## 2021-12-23 DIAGNOSIS — R06.02 SHORTNESS OF BREATH: Primary | ICD-10-CM

## 2021-12-23 DIAGNOSIS — J45.40 MODERATE PERSISTENT ASTHMA WITHOUT COMPLICATION: ICD-10-CM

## 2021-12-23 DIAGNOSIS — G47.30 SLEEP APNEA IN ADULT: ICD-10-CM

## 2021-12-23 PROCEDURE — 94060 EVALUATION OF WHEEZING: CPT | Performed by: INTERNAL MEDICINE

## 2021-12-23 PROCEDURE — 99215 OFFICE O/P EST HI 40 MIN: CPT | Performed by: INTERNAL MEDICINE

## 2021-12-23 PROCEDURE — 94726 PLETHYSMOGRAPHY LUNG VOLUMES: CPT | Performed by: INTERNAL MEDICINE

## 2021-12-23 PROCEDURE — 94729 DIFFUSING CAPACITY: CPT | Performed by: INTERNAL MEDICINE

## 2021-12-23 RX ORDER — ALBUTEROL SULFATE 90 UG/1
4 AEROSOL, METERED RESPIRATORY (INHALATION) ONCE
Status: COMPLETED | OUTPATIENT
Start: 2021-12-23 | End: 2021-12-23

## 2021-12-23 RX ADMIN — ALBUTEROL SULFATE 4 PUFF: 90 AEROSOL, METERED RESPIRATORY (INHALATION) at 12:59

## 2021-12-23 NOTE — PROGRESS NOTES
Initial Pulmonary Consult Note    Subjective   Reason for consultation/Chief Complaint: Shortness of breath    Elvin Smith is a 71 y.o. male is being seen for consultation today at the request of Hakan Pena MD    History of Present Illness  Mr. Smith is a 70yo M who is referred for shortness of breath. He has a history of paroxysmal Afib and is scheduled to undergo an ablation by Dr. Clark on 2/23/22. He first noticed shortness of breath in 2019. This has progressively worsened. He has been started on Cpap for KM with some improvement. He does not have a personal history of asthma. He has some cough and wheezing. He smoked briefly for 2 years and quit more than 30 years ago.     Active Ambulatory Problems     Diagnosis Date Noted   • Persistent atrial fibrillation (HCC) 05/09/2019   • Typical atrial flutter (HCC) 05/09/2019   • Essential hypertension 05/09/2019   • Hyperlipidemia LDL goal <100 05/09/2019   • BPH (benign prostatic hyperplasia) 05/09/2019   • Long term current use of antiarrhythmic medical therapy 06/25/2019   • Paroxysmal atrial fibrillation (HCC) 08/29/2019   • Paroxysmal A-fib ([facility]) 04/12/2021   • Sleep apnea in adult 12/14/2021   • Moderate persistent asthma without complication 12/23/2021   • Restrictive lung disease 12/23/2021     Resolved Ambulatory Problems     Diagnosis Date Noted   • Snoring 05/09/2019     Past Medical History:   Diagnosis Date   • Arrhythmia    • Borderline diabetes    • Cancer (HCC) 05/2019   • Enlarged prostate    • GERD (gastroesophageal reflux disease)    • History of angioedema 2009   • History of atrial fibrillation    • History of cardioversion    • History of MRSA infection 2009   • Hyperlipidemia    • Hypertension    • Skin cancer    • Suspected sleep apnea        Past Surgical History:   Procedure Laterality Date   • ANTERIOR CERVICAL FUSION     • BASAL CELL CARCINOMA EXCISION  03/2021    left ear   • CARDIAC ELECTROPHYSIOLOGY PROCEDURE N/A  2019    Procedure: Ablation atrial flutter;  Surgeon: Vivek Mercedes MD;  Location: [facility] INVASIVE LOCATION;  Service: Cardiovascular   • CARDIOVERSION  , 2019    x2   • COLONOSCOPY     • CYSTOSCOPY BLADDER BIOPSY     • EYE SURGERY Bilateral     cataract extraction    • HERNIA REPAIR Bilateral     inguinal    • PROSTATE SURGERY     • SKIN BIOPSY      skin cancer removed from face        Family History   Problem Relation Age of Onset   • Heart failure Mother    • Cancer Sister    • Alcohol abuse Brother    • No Known Problems Brother        Social History     Socioeconomic History   • Marital status: Single   Tobacco Use   • Smoking status: Former Smoker     Packs/day: 1.00     Years: 3.00     Pack years: 3.00     Types: Cigarettes     Quit date:      Years since quittin.0   • Smokeless tobacco: Never Used   Vaping Use   • Vaping Use: Never used   Substance and Sexual Activity   • Alcohol use: Yes     Comment: occas   • Drug use: No   • Sexual activity: Defer       No Known Allergies    Current Outpatient Medications   Medication Sig Dispense Refill   • apixaban (ELIQUIS) 5 MG tablet tablet Take 5 mg by mouth Every 12 (Twelve) Hours.     • azelastine (ASTELIN) 0.1 % nasal spray 2 sprays into the nostril(s) as directed by provider As Needed. Use in each nostril as directed      • diltiaZEM (TIAZAC) 360 MG 24 hr capsule Take 360 mg by mouth Every Morning.     • flecainide (TAMBOCOR) 50 MG tablet Take 1 tablet by mouth 2 (Two) Times a Day. (Patient taking differently: Take 100 mg by mouth As Needed.) 60 tablet 11   • fluticasone (FLONASE) 50 MCG/ACT nasal spray 2 sprays into the nostril(s) as directed by provider As Needed for Rhinitis.     • losartan (COZAAR) 25 MG tablet Take 25 mg by mouth Every Morning.     • omeprazole (priLOSEC) 20 MG capsule Take 20 mg by mouth Every Morning.     • simvastatin (ZOCOR) 40 MG tablet Take 40 mg by mouth Every Morning.     • fluticasone-salmeterol (ADVAIR)  "500-50 MCG/DOSE DISKUS Inhale 1 puff 2 (Two) Times a Day. 60 each 11   • fluticasone-salmeterol (ADVAIR) 500-50 MCG/DOSE DISKUS Inhale 1 puff 2 (Two) Times a Day. 60 each 0     No current facility-administered medications for this visit.       Review of Systems   HENT: Positive for sinus pressure.    Eyes: Negative.    Respiratory: Positive for shortness of breath and wheezing.    Cardiovascular: Negative.    Gastrointestinal: Negative.    Endocrine: Positive for polydipsia.   Genitourinary: Positive for frequency.   Musculoskeletal: Positive for gait problem.   Skin: Negative.    Allergic/Immunologic: Negative.    Neurological: Positive for dizziness, weakness and light-headedness.   Hematological: Negative.    Psychiatric/Behavioral: Positive for decreased concentration and sleep disturbance.         Objective   Blood pressure 108/60, pulse 93, temperature 97.3 °F (36.3 °C), temperature source Infrared, resp. rate 16, height 182.9 cm (72\"), weight (!) 138 kg (303 lb 9.6 oz), SpO2 96 %.  Physical Exam  Vitals and nursing note reviewed.   Constitutional:       General: He is not in acute distress.     Appearance: He is well-developed.   HENT:      Head: Normocephalic and atraumatic.   Eyes:      General: No scleral icterus.     Conjunctiva/sclera: Conjunctivae normal.      Pupils: Pupils are equal, round, and reactive to light.   Neck:      Thyroid: No thyromegaly.      Trachea: No tracheal deviation.   Cardiovascular:      Rate and Rhythm: Normal rate and regular rhythm.      Heart sounds: Normal heart sounds.   Pulmonary:      Effort: Pulmonary effort is normal. No respiratory distress.      Breath sounds: Decreased breath sounds present. No wheezing.   Abdominal:      General: Bowel sounds are normal.      Palpations: Abdomen is soft.      Tenderness: There is no abdominal tenderness.   Musculoskeletal:         General: Normal range of motion.      Cervical back: Normal range of motion and neck supple. "   Lymphadenopathy:      Cervical: No cervical adenopathy.   Skin:     General: Skin is warm and dry.      Findings: No erythema or rash.   Neurological:      Mental Status: He is alert and oriented to person, place, and time.      Motor: No abnormal muscle tone.      Coordination: Coordination normal.   Psychiatric:         Speech: Speech normal.         Behavior: Behavior normal.         Judgment: Judgment normal.         PFTs:  Performed in clinic and personally reviewed.   There is moderate airway obstruction with a significant response to bronchodilators.   There is moderate restriction.   The DLCO is normal.     Imaging:  Chest xray performed in clinic and personally reviewed. This is a PA/Lateral film. The cardiac and mediastinal contours are within normal limits. The lungs are grossly clear bilaterally. There is elevation of the left hemidiaphragm which is stable back to 2015. The is no pneumothorax or pleural effusion.     Impression: No acute cardiopulmonary process. There is chronic elevation of the left hemidiaphragm.     Assessment/Plan   Diagnoses and all orders for this visit:    1. Shortness of breath (Primary)  -     albuterol sulfate HFA (PROVENTIL HFA;VENTOLIN HFA;PROAIR HFA) inhaler 4 puff  -     Pulmonary Function Test  -     XR Chest PA & Lateral    2. Moderate persistent asthma without complication    3. Restrictive lung disease    4. Paroxysmal atrial fibrillation (HCC)    5. Sleep apnea in adult    Other orders  -     fluticasone-salmeterol (ADVAIR) 500-50 MCG/DOSE DISKUS; Inhale 1 puff 2 (Two) Times a Day.  Dispense: 60 each; Refill: 11  -     fluticasone-salmeterol (ADVAIR) 500-50 MCG/DOSE DISKUS; Inhale 1 puff 2 (Two) Times a Day.  Dispense: 60 each; Refill: 0        Discussion:  Mr. Smith is a 70yo M who is referred for shortness of breath.     1. Asthma  - His PFTs are indicative of asthma as he only smoked for approximately 2 years and has a significant response to bronchodilators.    - I have sent an Rx for Advair 500. I have asked him to call if this is too expensive so that we may switch him to a different inhaler.   - PRN Albuterol.     2. Restrictive Lung Disease  - Likely secondary to chronically elevated left hemidiaphragm and obesity.   - He has a CTA of the chest ordered by Dr. Clark and we should be able to assess his lung parenchyma on this as well.     3. Atrial Fibrillation  - Scheduled for ablation in February.     4. KM  - Continue Cpap therapy.     Follow up in 3-4 months to assess shortness of breath after inhaler therapy and ablation.          I have spent 62 minutes reviewing the patient record, face to face with the patient in discussion of test results and plans for further management and in documentation and coordination of care.       Lucretia V Case, DO  Pulmonary and Critical Care Medicine  Note Electronically Signed

## 2022-01-17 NOTE — TELEPHONE ENCOUNTER
Fax refill request for Rx Advair 500/50 for 90 day supply approved. Refilled Rx Advair 500/50 per chart via fax to [facility] for 90 day supply.

## 2022-01-18 ENCOUNTER — OFFICE VISIT (OUTPATIENT)
Dept: SLEEP MEDICINE | Facility: HOSPITAL | Age: 72
End: 2022-01-18

## 2022-01-18 VITALS
OXYGEN SATURATION: 93 % | DIASTOLIC BLOOD PRESSURE: 80 MMHG | BODY MASS INDEX: 41.17 KG/M2 | HEIGHT: 72 IN | HEART RATE: 91 BPM | SYSTOLIC BLOOD PRESSURE: 140 MMHG | WEIGHT: 304 LBS

## 2022-01-18 DIAGNOSIS — F51.04 PSYCHOPHYSIOLOGICAL INSOMNIA: ICD-10-CM

## 2022-01-18 DIAGNOSIS — G47.33 OBSTRUCTIVE SLEEP APNEA, ADULT: Primary | ICD-10-CM

## 2022-01-18 DIAGNOSIS — G47.33 OSA (OBSTRUCTIVE SLEEP APNEA): Primary | ICD-10-CM

## 2022-01-18 PROCEDURE — 99213 OFFICE O/P EST LOW 20 MIN: CPT | Performed by: NURSE PRACTITIONER

## 2022-01-18 RX ORDER — FLECAINIDE ACETATE 100 MG/1
TABLET ORAL
COMMUNITY
Start: 2021-11-17 | End: 2022-02-22

## 2022-01-18 RX ORDER — OXYBUTYNIN CHLORIDE 15 MG/1
TABLET, EXTENDED RELEASE ORAL
COMMUNITY
Start: 2021-12-18 | End: 2022-02-22

## 2022-01-18 NOTE — PROGRESS NOTES
Chief Complaint:   Chief Complaint   Patient presents with   • Follow-up       HPI:    Elvin Smith is a 71 y.o. male here for follow-up of sleep apnea. Patient was seen in consult 11-4-21 for snoring, witnessed apneas, nonrestorative sleep. He was originally diagnosed with sleep apnea in 2019 and did wear CPAP a while but had not used it in some time. He did reinitiate CPAP therapy. Patient states he gets 2-1/2 hours of sleep in increments throughout the night he does get up and go to the restroom with only small amount of output. He does put his [place] score at 9/24. We did discuss trying extended release melatonin before moving forward with any prescription medication and he does wish to try this. Otherwise he has no concerns or complaints and will continue CPAP.        Current medications are:   Current Outpatient Medications:   •  apixaban (ELIQUIS) 5 MG tablet tablet, Take 5 mg by mouth Every 12 (Twelve) Hours., Disp: , Rfl:   •  azelastine (ASTELIN) 0.1 % nasal spray, 2 sprays into the nostril(s) as directed by provider As Needed. Use in each nostril as directed , Disp: , Rfl:   •  diltiaZEM (TIAZAC) 360 MG 24 hr capsule, Take 360 mg by mouth Every Morning., Disp: , Rfl:   •  fluticasone (FLONASE) 50 MCG/ACT nasal spray, 2 sprays into the nostril(s) as directed by provider As Needed for Rhinitis., Disp: , Rfl:   •  fluticasone-salmeterol (ADVAIR) 500-50 MCG/DOSE DISKUS, Inhale 1 puff 2 (Two) Times a Day., Disp: 180 each, Rfl: 1  •  losartan (COZAAR) 25 MG tablet, Take 25 mg by mouth Every Morning., Disp: , Rfl:   •  omeprazole (priLOSEC) 20 MG capsule, Take 20 mg by mouth Every Morning., Disp: , Rfl:   •  simvastatin (ZOCOR) 40 MG tablet, Take 40 mg by mouth Every Morning., Disp: , Rfl:   •  flecainide (TAMBOCOR) 100 MG tablet, , Disp: , Rfl:   •  oxybutynin XL (DITROPAN XL) 15 MG 24 hr tablet, , Disp: , Rfl: .      The patient's relevant past medical, surgical, family and social history were  reviewed and updated in Epic as appropriate.       Review of Systems   Respiratory: Positive for apnea, shortness of breath and wheezing.    Cardiovascular: Positive for palpitations.   Gastrointestinal:        Heartburn   Genitourinary: Positive for frequency.   Allergic/Immunologic: Positive for environmental allergies.   All other systems reviewed and are negative.        Objective:    Physical Exam  Constitutional:       Appearance: Normal appearance.   HENT:      Head: Normocephalic and atraumatic.   Cardiovascular:      Rate and Rhythm: Rhythm irregular.   Pulmonary:      Effort: Pulmonary effort is normal.      Breath sounds: Normal breath sounds.   Skin:     General: Skin is warm and dry.   Neurological:      Mental Status: He is alert and oriented to person, place, and time.   Psychiatric:         Mood and Affect: Mood normal.         Behavior: Behavior normal.         Thought Content: Thought content normal.         Judgment: Judgment normal.       29/30 days of use  Average use 6 hours 48 minutes  9.5 versus 95th percentile pressure  AHI 4    ASSESSMENT/PLAN    Diagnoses and all orders for this visit:    1. Obstructive sleep apnea, adult (Primary)    2. Psychophysiological insomnia            1. Counseled patient regarding multimodal approach with healthy nutrition, healthy sleep, regular physical activity, social activities, counseling, and medications. Encouraged to practice lateral sleep position. Avoid alcohol and sedatives close to bedtime.  2. Patient to continue use with CPAP therapy he will also try extended release melatonin will let me know should that does not work we can move forward with a different medication otherwise, I will see him back in 1 year.    I have reviewed the results of my evaluation and impression and discussed my recommendations in detail with the patient.      Signed by  CHARITY Joseph    January 18, 2022      CC: Hakan Pena MD          No ref. provider  found

## 2022-02-18 NOTE — NURSING NOTE
PRE-PVA ASSESSMENT  Elvin Smith 1950   [address] [place] [id number] [phone]    Primary Care Physician: Hakan Pena MD  Information obtained from: [x] Medical record review  [x] Patient report  Scheduled for: PVA on 2/23/22 with Dr. Clark  No Known Allergies    AFib Specific History:  AFIBTYPE: persistent    CHADS-VASc Risk Assessment            2 Total Score    1 Hypertension    1 Age 65-74        Criteria that do not apply:    CHF    Age >/= 75    DM    PRIOR STROKE/TIA/THROMBO    Vascular Disease    Sex: Female        Anticoagulation: Eliquis 5 mg bid NO MISSED DOSES  Cardioversion x 2  Failed AAD(s): flecainide  Prior Ablation: 2019 for typical right atrial flutter    Is Mr. Smith aware of his AFib? Yes   Onset: 2017    Exacerbations: none   Frequency: persistent  Alleviations: none     Duration: persistent    Symptoms:   [] Palpitations:    [] Chest Discomfort:    [] Dizziness:    [] Presyncope:    [] Lightheadedness:   [] Syncope:    [x] Fatigue:    [] Other:    [x] Short of Breath:     Last Echo(s):  [x] TTE Date: 4/12/21               EF: 56-60%           LA: 3 cm             VHD trace TR          Past medical History:   [] Diabetes  Borderline                  No results found for: HGBA1C       [] HYPOthyroidism No   [] HYPERthyroidism No        No results found for: TSH    [x] HTN        [x] Tx cozaar       [x] Controlled    [] Heart Failure  No   [] CVA    No                            [] TIA  No        [] Ischemic         [] Hemorrhagic         [] Nonischemic         [] Embolic        [] Diastolic    [] CAD   No      [] MI  No          [x] Dyslipidemia on zocor     [x] Ischemic Evaluation   [x] Stress Test: 12/7/21  · Myocardial perfusion imaging indicates a normal myocardial perfusion study with no evidence of ischemia.  · Left ventricular ejection fraction is normal. (Calculated EF = 65%).  · Minimal coronary calcium on CT images  · Impressions are consistent with a  low risk study.       [] Heart Cath: No    [x] Sleep Apnea Diagnosed       Device: CPAP        Compliance: compliance all of the time     [x] Obesity       [x] BMI 40.58        [x] Weight reduction discussed with Mr. Smith         [] Nutrition Consult            [x] Declined by Mr. Smith     [] Depression  No [] Anxiety No               [x] Urologic History       [x] Urologic cancer surgery, bladders surgically removed         [] Severe decrease in flow of urine stream        [] Recent unexplained gross hematuria       [] Hx urethral stricture     Other Pertinent PMH: GERD, H/O angioedema    Social / Lifestyle History:   [x]   Tobacco:                     [x] Former: 1 PPD quit 1979   [x]   Alcohol:          [x] Current: socially, rare    [x]   Caffeine: 1 cup per day   []   Recreational Drugs: Denies    []   Stress Issues: No    []   Exercise: none    Summary of Patient Contact:    I spoke with Mr. Smith about his upcoming PVA.   He was well informed about the procedure from prior discussion with Dr. Clark and from reading the provided literature.  We discussed the procedure at length including risks, anesthesia, intra-op procedures, recovery, bedrest, sheath removal, discharge criteria, normal post-procedure expectations, and success rates.  I answered a few remaining questions. Mr. Smith verbalized understanding and he is ready to proceed.    Barb Fuentes RN

## 2022-02-22 ENCOUNTER — HOSPITAL ENCOUNTER (OUTPATIENT)
Dept: CT IMAGING | Facility: HOSPITAL | Age: 72
Discharge: HOME | End: 2022-02-22
Admitting: INTERNAL MEDICINE

## 2022-02-22 ENCOUNTER — PRE-ADMISSION TESTING (OUTPATIENT)
Dept: PREADMISSION TESTING | Facility: HOSPITAL | Age: 72
End: 2022-02-22

## 2022-02-22 DIAGNOSIS — I48.19 PERSISTENT ATRIAL FIBRILLATION: ICD-10-CM

## 2022-02-22 DIAGNOSIS — I48.19 PERSISTENT ATRIAL FIBRILLATION: Primary | ICD-10-CM

## 2022-02-22 LAB
ANION GAP SERPL CALCULATED.3IONS-SCNC: 11 MMOL/L (ref 5–15)
BUN SERPL-MCNC: 15 MG/DL (ref 8–23)
BUN/CREAT SERPL: 16 (ref 7–25)
CALCIUM SPEC-SCNC: 9.2 MG/DL (ref 8.6–10.5)
CHLORIDE SERPL-SCNC: 103 MMOL/L (ref 98–107)
CO2 SERPL-SCNC: 26 MMOL/L (ref 22–29)
CREAT SERPL-MCNC: 0.94 MG/DL (ref 0.76–1.27)
DEPRECATED RDW RBC AUTO: 45.1 FL (ref 37–54)
ERYTHROCYTE [DISTWIDTH] IN BLOOD BY AUTOMATED COUNT: 14.1 % (ref 12.3–15.4)
GFR SERPL CREATININE-BSD FRML MDRD: 79 ML/MIN/1.73
GLUCOSE SERPL-MCNC: 137 MG/DL (ref 65–99)
HCT VFR BLD AUTO: 46.8 % (ref 37.5–51)
HGB BLD-MCNC: 15.2 G/DL (ref 13–17.7)
MAGNESIUM SERPL-MCNC: 2.1 MG/DL (ref 1.6–2.4)
MCH RBC QN AUTO: 28.3 PG (ref 26.6–33)
MCHC RBC AUTO-ENTMCNC: 32.5 G/DL (ref 31.5–35.7)
MCV RBC AUTO: 87.2 FL (ref 79–97)
PLATELET # BLD AUTO: 265 10*3/MM3 (ref 140–450)
PMV BLD AUTO: 9.6 FL (ref 6–12)
POTASSIUM SERPL-SCNC: 4.3 MMOL/L (ref 3.5–5.2)
RBC # BLD AUTO: 5.37 10*6/MM3 (ref 4.14–5.8)
SARS-COV-2 RNA PNL SPEC NAA+PROBE: NOT DETECTED
SODIUM SERPL-SCNC: 140 MMOL/L (ref 136–145)
TSH SERPL DL<=0.05 MIU/L-ACNC: 2.88 UIU/ML (ref 0.27–4.2)
WBC NRBC COR # BLD: 6.13 10*3/MM3 (ref 3.4–10.8)

## 2022-02-22 PROCEDURE — 36415 COLL VENOUS BLD VENIPUNCTURE: CPT

## 2022-02-22 PROCEDURE — U0004 COV-19 TEST NON-CDC HGH THRU: HCPCS

## 2022-02-22 PROCEDURE — C9803 HOPD COVID-19 SPEC COLLECT: HCPCS

## 2022-02-22 PROCEDURE — 84443 ASSAY THYROID STIM HORMONE: CPT

## 2022-02-22 PROCEDURE — 80048 BASIC METABOLIC PNL TOTAL CA: CPT

## 2022-02-22 PROCEDURE — 71275 CT ANGIOGRAPHY CHEST: CPT

## 2022-02-22 PROCEDURE — 83735 ASSAY OF MAGNESIUM: CPT

## 2022-02-22 PROCEDURE — 85027 COMPLETE CBC AUTOMATED: CPT

## 2022-02-22 PROCEDURE — 0 IOPAMIDOL PER 1 ML: Performed by: INTERNAL MEDICINE

## 2022-02-22 RX ADMIN — IOPAMIDOL 80 ML: 755 INJECTION, SOLUTION INTRAVENOUS at 10:36

## 2022-02-22 NOTE — DISCHARGE INSTRUCTIONS

## 2022-02-23 ENCOUNTER — HOSPITAL ENCOUNTER (OUTPATIENT)
Facility: HOSPITAL | Age: 72
Discharge: HOME | End: 2022-02-23
Attending: INTERNAL MEDICINE | Admitting: INTERNAL MEDICINE

## 2022-02-23 ENCOUNTER — ANESTHESIA EVENT (OUTPATIENT)
Dept: CARDIOLOGY | Facility: HOSPITAL | Age: 72
End: 2022-02-23

## 2022-02-23 ENCOUNTER — ANESTHESIA (OUTPATIENT)
Dept: CARDIOLOGY | Facility: HOSPITAL | Age: 72
End: 2022-02-23

## 2022-02-23 VITALS
WEIGHT: 302.8 LBS | HEART RATE: 102 BPM | TEMPERATURE: 97.4 F | RESPIRATION RATE: 20 BRPM | HEIGHT: 72 IN | OXYGEN SATURATION: 93 % | DIASTOLIC BLOOD PRESSURE: 74 MMHG | SYSTOLIC BLOOD PRESSURE: 97 MMHG | BODY MASS INDEX: 41.01 KG/M2

## 2022-02-23 DIAGNOSIS — I48.19 PERSISTENT ATRIAL FIBRILLATION: ICD-10-CM

## 2022-02-23 LAB
ACT BLD: 368 SECONDS (ref 82–152)
ACT BLD: 374 SECONDS (ref 82–152)
ACT BLD: 380 SECONDS (ref 82–152)
ACT BLD: 380 SECONDS (ref 82–152)

## 2022-02-23 PROCEDURE — 85347 COAGULATION TIME ACTIVATED: CPT

## 2022-02-23 PROCEDURE — 93622 COMP EP EVAL L VENTR PAC&REC: CPT | Performed by: INTERNAL MEDICINE

## 2022-02-23 PROCEDURE — C1732 CATH, EP, DIAG/ABL, 3D/VECT: HCPCS | Performed by: INTERNAL MEDICINE

## 2022-02-23 PROCEDURE — 93010 ELECTROCARDIOGRAM REPORT: CPT | Performed by: INTERNAL MEDICINE

## 2022-02-23 PROCEDURE — C1893 INTRO/SHEATH, FIXED,NON-PEEL: HCPCS | Performed by: INTERNAL MEDICINE

## 2022-02-23 PROCEDURE — 93657 TX L/R ATRIAL FIB ADDL: CPT | Performed by: INTERNAL MEDICINE

## 2022-02-23 PROCEDURE — C1760 CLOSURE DEV, VASC: HCPCS | Performed by: INTERNAL MEDICINE

## 2022-02-23 PROCEDURE — 25010000002 ONDANSETRON PER 1 MG: Performed by: NURSE ANESTHETIST, CERTIFIED REGISTERED

## 2022-02-23 PROCEDURE — 93655 ICAR CATH ABLTJ DSCRT ARRHYT: CPT | Performed by: INTERNAL MEDICINE

## 2022-02-23 PROCEDURE — C1894 INTRO/SHEATH, NON-LASER: HCPCS | Performed by: INTERNAL MEDICINE

## 2022-02-23 PROCEDURE — C1769 GUIDE WIRE: HCPCS | Performed by: INTERNAL MEDICINE

## 2022-02-23 PROCEDURE — S0260 H&P FOR SURGERY: HCPCS | Performed by: PHYSICIAN ASSISTANT

## 2022-02-23 PROCEDURE — C1730 CATH, EP, 19 OR FEW ELECT: HCPCS | Performed by: INTERNAL MEDICINE

## 2022-02-23 PROCEDURE — 25010000002 HEPARIN (PORCINE) PER 1000 UNITS: Performed by: INTERNAL MEDICINE

## 2022-02-23 PROCEDURE — C1760 CLOSURE DEV, VASC: HCPCS

## 2022-02-23 PROCEDURE — C1766 INTRO/SHEATH,STRBLE,NON-PEEL: HCPCS | Performed by: INTERNAL MEDICINE

## 2022-02-23 PROCEDURE — 25010000002 DEXAMETHASONE PER 1 MG: Performed by: NURSE ANESTHETIST, CERTIFIED REGISTERED

## 2022-02-23 PROCEDURE — 93005 ELECTROCARDIOGRAM TRACING: CPT | Performed by: INTERNAL MEDICINE

## 2022-02-23 PROCEDURE — 25010000002 PHENYLEPHRINE 10 MG/ML SOLUTION 1 ML VIAL: Performed by: NURSE ANESTHETIST, CERTIFIED REGISTERED

## 2022-02-23 PROCEDURE — 25010000002 PROTAMINE SULFATE PER 10 MG: Performed by: INTERNAL MEDICINE

## 2022-02-23 PROCEDURE — C1759 CATH, INTRA ECHOCARDIOGRAPHY: HCPCS | Performed by: INTERNAL MEDICINE

## 2022-02-23 PROCEDURE — 93656 COMPRE EP EVAL ABLTJ ATR FIB: CPT | Performed by: INTERNAL MEDICINE

## 2022-02-23 PROCEDURE — 93623 PRGRMD STIMJ&PACG IV RX NFS: CPT | Performed by: INTERNAL MEDICINE

## 2022-02-23 PROCEDURE — 25010000002 KETOROLAC TROMETHAMINE PER 15 MG: Performed by: INTERNAL MEDICINE

## 2022-02-23 PROCEDURE — 25010000002 FUROSEMIDE PER 20 MG: Performed by: INTERNAL MEDICINE

## 2022-02-23 PROCEDURE — 25010000002 PROPOFOL 10 MG/ML EMULSION: Performed by: NURSE ANESTHETIST, CERTIFIED REGISTERED

## 2022-02-23 PROCEDURE — 25010000002 NEOSTIGMINE 10 MG/10ML SOLUTION: Performed by: NURSE ANESTHETIST, CERTIFIED REGISTERED

## 2022-02-23 PROCEDURE — 25010000002 ADENOSINE PER 6 MG: Performed by: INTERNAL MEDICINE

## 2022-02-23 RX ORDER — NITROGLYCERIN 0.4 MG/1
0.4 TABLET SUBLINGUAL
Status: DISCONTINUED | OUTPATIENT
Start: 2022-02-23 | End: 2022-02-23 | Stop reason: HOSPADM

## 2022-02-23 RX ORDER — LIDOCAINE HYDROCHLORIDE 10 MG/ML
INJECTION, SOLUTION EPIDURAL; INFILTRATION; INTRACAUDAL; PERINEURAL AS NEEDED
Status: DISCONTINUED | OUTPATIENT
Start: 2022-02-23 | End: 2022-02-23 | Stop reason: SURG

## 2022-02-23 RX ORDER — KETOROLAC TROMETHAMINE 15 MG/ML
15 INJECTION, SOLUTION INTRAMUSCULAR; INTRAVENOUS EVERY 8 HOURS
Status: DISCONTINUED | OUTPATIENT
Start: 2022-02-23 | End: 2022-02-23 | Stop reason: HOSPADM

## 2022-02-23 RX ORDER — ROCURONIUM BROMIDE 10 MG/ML
INJECTION, SOLUTION INTRAVENOUS AS NEEDED
Status: DISCONTINUED | OUTPATIENT
Start: 2022-02-23 | End: 2022-02-23 | Stop reason: SURG

## 2022-02-23 RX ORDER — CEFAZOLIN SODIUM 2 G/100ML
2 INJECTION, SOLUTION INTRAVENOUS ONCE
Status: DISCONTINUED | OUTPATIENT
Start: 2022-02-23 | End: 2022-02-23 | Stop reason: HOSPADM

## 2022-02-23 RX ORDER — PROPOFOL 10 MG/ML
VIAL (ML) INTRAVENOUS AS NEEDED
Status: DISCONTINUED | OUTPATIENT
Start: 2022-02-23 | End: 2022-02-23 | Stop reason: SURG

## 2022-02-23 RX ORDER — ADENOSINE 3 MG/ML
INJECTION, SOLUTION INTRAVENOUS AS NEEDED
Status: DISCONTINUED | OUTPATIENT
Start: 2022-02-23 | End: 2022-02-23 | Stop reason: HOSPADM

## 2022-02-23 RX ORDER — FUROSEMIDE 10 MG/ML
60 INJECTION INTRAMUSCULAR; INTRAVENOUS ONCE
Status: COMPLETED | OUTPATIENT
Start: 2022-02-23 | End: 2022-02-23

## 2022-02-23 RX ORDER — HEPARIN SODIUM 10000 [USP'U]/100ML
50 INJECTION, SOLUTION INTRAVENOUS
Status: DISCONTINUED | OUTPATIENT
Start: 2022-02-23 | End: 2022-02-23

## 2022-02-23 RX ORDER — SODIUM CHLORIDE 9 MG/ML
INJECTION, SOLUTION INTRAVENOUS CONTINUOUS PRN
Status: DISCONTINUED | OUTPATIENT
Start: 2022-02-23 | End: 2022-02-23 | Stop reason: SURG

## 2022-02-23 RX ORDER — ACETAMINOPHEN 325 MG/1
650 TABLET ORAL EVERY 4 HOURS PRN
Status: DISCONTINUED | OUTPATIENT
Start: 2022-02-23 | End: 2022-02-23 | Stop reason: HOSPADM

## 2022-02-23 RX ORDER — ONDANSETRON 2 MG/ML
INJECTION INTRAMUSCULAR; INTRAVENOUS AS NEEDED
Status: DISCONTINUED | OUTPATIENT
Start: 2022-02-23 | End: 2022-02-23 | Stop reason: SURG

## 2022-02-23 RX ORDER — PROTAMINE SULFATE 10 MG/ML
INJECTION, SOLUTION INTRAVENOUS AS NEEDED
Status: DISCONTINUED | OUTPATIENT
Start: 2022-02-23 | End: 2022-02-23 | Stop reason: HOSPADM

## 2022-02-23 RX ORDER — BUPIVACAINE HCL/0.9 % NACL/PF 0.125 %
PLASTIC BAG, INJECTION (ML) EPIDURAL AS NEEDED
Status: DISCONTINUED | OUTPATIENT
Start: 2022-02-23 | End: 2022-02-23 | Stop reason: SURG

## 2022-02-23 RX ORDER — HEPARIN SODIUM 1000 [USP'U]/ML
INJECTION, SOLUTION INTRAVENOUS; SUBCUTANEOUS AS NEEDED
Status: DISCONTINUED | OUTPATIENT
Start: 2022-02-23 | End: 2022-02-23 | Stop reason: HOSPADM

## 2022-02-23 RX ORDER — FUROSEMIDE 10 MG/ML
40 INJECTION INTRAMUSCULAR; INTRAVENOUS ONCE
Status: COMPLETED | OUTPATIENT
Start: 2022-02-23 | End: 2022-02-23

## 2022-02-23 RX ORDER — SODIUM CHLORIDE 9 MG/ML
INJECTION, SOLUTION INTRAVENOUS CONTINUOUS PRN
Status: DISCONTINUED | OUTPATIENT
Start: 2022-02-23 | End: 2022-02-23 | Stop reason: HOSPADM

## 2022-02-23 RX ORDER — ONDANSETRON 2 MG/ML
4 INJECTION INTRAMUSCULAR; INTRAVENOUS EVERY 6 HOURS PRN
Status: DISCONTINUED | OUTPATIENT
Start: 2022-02-23 | End: 2022-02-23 | Stop reason: HOSPADM

## 2022-02-23 RX ORDER — HEPARIN SODIUM 10000 [USP'U]/100ML
INJECTION, SOLUTION INTRAVENOUS CONTINUOUS PRN
Status: DISCONTINUED | OUTPATIENT
Start: 2022-02-23 | End: 2022-02-23 | Stop reason: HOSPADM

## 2022-02-23 RX ORDER — GLYCOPYRROLATE 0.2 MG/ML
INJECTION INTRAMUSCULAR; INTRAVENOUS AS NEEDED
Status: DISCONTINUED | OUTPATIENT
Start: 2022-02-23 | End: 2022-02-23 | Stop reason: SURG

## 2022-02-23 RX ORDER — NEOSTIGMINE METHYLSULFATE 1 MG/ML
INJECTION, SOLUTION INTRAVENOUS AS NEEDED
Status: DISCONTINUED | OUTPATIENT
Start: 2022-02-23 | End: 2022-02-23 | Stop reason: SURG

## 2022-02-23 RX ORDER — DEXAMETHASONE SODIUM PHOSPHATE 4 MG/ML
INJECTION, SOLUTION INTRA-ARTICULAR; INTRALESIONAL; INTRAMUSCULAR; INTRAVENOUS; SOFT TISSUE AS NEEDED
Status: DISCONTINUED | OUTPATIENT
Start: 2022-02-23 | End: 2022-02-23 | Stop reason: SURG

## 2022-02-23 RX ORDER — HEPARIN SOD,PORCINE/0.9 % NACL 25000/250
50 INTRAVENOUS SOLUTION INTRAVENOUS
Status: DISCONTINUED | OUTPATIENT
Start: 2022-02-23 | End: 2022-02-23

## 2022-02-23 RX ORDER — FUROSEMIDE 40 MG/1
60 TABLET ORAL DAILY
Qty: 90 TABLET | Refills: 3 | Status: SHIPPED | OUTPATIENT
Start: 2022-02-23 | End: 2022-06-06 | Stop reason: SDUPTHER

## 2022-02-23 RX ADMIN — DEXAMETHASONE SODIUM PHOSPHATE 4 MG: 4 INJECTION INTRA-ARTICULAR; INTRALESIONAL; INTRAMUSCULAR; INTRAVENOUS; SOFT TISSUE at 08:39

## 2022-02-23 RX ADMIN — ONDANSETRON 4 MG: 2 INJECTION INTRAMUSCULAR; INTRAVENOUS at 11:45

## 2022-02-23 RX ADMIN — NEOSTIGMINE METHYLSULFATE 4 MG: 0.5 INJECTION INTRAVENOUS at 11:45

## 2022-02-23 RX ADMIN — FUROSEMIDE 60 MG: 10 INJECTION INTRAMUSCULAR; INTRAVENOUS at 13:21

## 2022-02-23 RX ADMIN — GLYCOPYRROLATE 0.6 MG: 0.2 INJECTION INTRAMUSCULAR; INTRAVENOUS at 11:45

## 2022-02-23 RX ADMIN — FUROSEMIDE 40 MG: 10 INJECTION, SOLUTION INTRAMUSCULAR; INTRAVENOUS at 15:47

## 2022-02-23 RX ADMIN — SODIUM CHLORIDE: 9 INJECTION, SOLUTION INTRAVENOUS at 08:24

## 2022-02-23 RX ADMIN — LIDOCAINE HYDROCHLORIDE 50 MG: 10 INJECTION, SOLUTION EPIDURAL; INFILTRATION; INTRACAUDAL; PERINEURAL at 08:38

## 2022-02-23 RX ADMIN — PROPOFOL 150 MG: 10 INJECTION, EMULSION INTRAVENOUS at 08:36

## 2022-02-23 RX ADMIN — KETOROLAC TROMETHAMINE 15 MG: 15 INJECTION, SOLUTION INTRAMUSCULAR; INTRAVENOUS at 13:20

## 2022-02-23 RX ADMIN — ROCURONIUM BROMIDE 60 MG: 10 INJECTION INTRAVENOUS at 08:36

## 2022-02-23 RX ADMIN — SUGAMMADEX 200 MG: 100 INJECTION, SOLUTION INTRAVENOUS at 12:02

## 2022-02-23 RX ADMIN — Medication 100 MCG: at 11:49

## 2022-02-23 RX ADMIN — PHENYLEPHRINE HYDROCHLORIDE 0.5 MCG/KG/MIN: 10 INJECTION INTRAVENOUS at 08:41

## 2022-02-23 NOTE — OP NOTE
Cardiac Electrophysiology Procedure Note           [facility]         CATHETER ABLATION FOR ATRIAL FIBRILLATION (PVI)    PROCEDURES PERFORMED:    · Catheter ablation of persistent atrial fibrillation  · Adjunctive ablation of the left atrial roof for persistent atrial fibrillation  · Adjunctive ablation of the left atrial inferior posterior wall for persistent atrial fibrillation  · Catheter ablation of SVT #1 focal atrial tachycardia arising from the base left atrial appendage  · After ablation of SVT #2 typical right atrial flutter  · Full diagnostic EP study  · 3D electroanatomic mapping  · drug infusion / programmed pacing  · Intracadiac Echocardiography  · Double transeptal puncture  · Left ventricular pacing and recording    PREPROCEDURAL DIAGNOSES:    1.  Persistent atrial fibrillation atrial fibrillation  2. [id number] score of 3  3.  Typical right atrial flutter    POST PROCEDURE DIANGOSES:  As above.    INDICATION FOR PROCEDURE:  Briefly, Elvin Smith is a 71 y.o. year old male with a history of highly symptomatic recurrent persistent atrial fibrillation despite antiarrhythmic drug therapy.  He underwent catheter ablation for typical flutter in 2019 and has had recurrence of typical flutter intermittently mixed with atrial fibrillation.  Presents today for this elective outpatient procedure    ANTICOAGULATION STRATEGY PRIOR TO AND POST PROCEDURE: Apixaban 5 mg twice daily.  The last dose of anticoagulant was confirmed to have been taken this morning.      PT/INR:  No results found for: LABPROT, INR  PTT:  No results found for: APTT    CBC:   WBC   Date Value Ref Range Status   02/22/2022 6.13 3.40 - 10.80 10*3/mm3 Final     RBC   Date Value Ref Range Status   02/22/2022 5.37 4.14 - 5.80 10*6/mm3 Final     Hemoglobin   Date Value Ref Range Status   02/22/2022 15.2 13.0 - 17.7 g/dL Final     Hematocrit   Date Value Ref Range Status   02/22/2022 46.8 37.5  "- 51.0 % Final     MCV   Date Value Ref Range Status   02/22/2022 87.2 79.0 - 97.0 fL Final     RDW   Date Value Ref Range Status   02/22/2022 14.1 12.3 - 15.4 % Final     Platelets   Date Value Ref Range Status   02/22/2022 265 140 - 450 10*3/mm3 Final     BMP:     Sodium   Date Value Ref Range Status   02/22/2022 140 136 - 145 mmol/L Final     Potassium   Date Value Ref Range Status   02/22/2022 4.3 3.5 - 5.2 mmol/L Final     Chloride   Date Value Ref Range Status   02/22/2022 103 98 - 107 mmol/L Final     CO2   Date Value Ref Range Status   02/22/2022 26.0 22.0 - 29.0 mmol/L Final     BUN   Date Value Ref Range Status   02/22/2022 15 8 - 23 mg/dL Final     Creatinine   Date Value Ref Range Status   02/22/2022 0.94 0.76 - 1.27 mg/dL Final       Vital Signs: /79 (BP Location: Right arm, Patient Position: Lying)   Pulse 93   Temp 97.2 °F (36.2 °C) (Temporal)   Resp 18   Ht 182.9 cm (72\")   Wt (!) 137 kg (302 lb 12.8 oz)   SpO2 92%   BMI 41.07 kg/m²      Admit Weight:  (!) 137 kg (302 lb 12.8 oz)  BMI: Body mass index is 41.07 kg/m².    PROCEDURE NARRATIVE:    The patient was able to give written informed consent after revisiting the key portions of the risk versus benefit profile of the procedure.  This discussion was framed by our lengthy conversations  (please see our detailed notes).  Patient verbalized strong understanding of this discussion and a strong desire to proceed with the procedure.  Please note that this detailed informed consent process utilized mutual and shared decision making process between all parties involved, principally the physician and patient, but also potentially with input from the patient's selected family and friends.    The patient was brought to the EP laboratory in the post absorptive state.  His presenting rhythm is indeed was atrial fibrillation.  The patient was electively intubated for the procedure and given a general anesthetic by colleagues from anesthesia.   An " rashad-gastric tube was placed by anesthesia staff.  A radial artery catheter was placed by anesthesia staff for continuous blood pressure monitoring.  Please see the detailed anesthesia records.    The patient was then prepared and draped in a routing sterile fashion.  Seldinger access was obtained at the bilateral common femoral veins with 5 venipunctures.  J tip wires were advanced into the vascular space.  Short 11,8,6 Qatari sheaths, an SL0 sheath and a deflectable sheath were placed into the bilateral common femoral veins and the inferior vena cava / right atrium in an over the wire fashion.    The patient was anticoagulated with intravenous heparin (initial bolus and then continuous infusion) with a goal ACT of between 350 and 400 seconds.  A phased array ICE catheter was placed into the right atrium and right ventricle.  This was used for transeptal puncture, monitoring of the pericardial space, monitoring of the ablation catheter position within the heart and monitoring of other cardiac structures such as the left atrial appendage (to document the absence of thrombus), pulmonary veins, esophagus, mitral valve annulus and other cardiac structures.    Double transeptal puncture was performed after heparin administration with a combination of echocardiographic and fluoroscopic guidance.  This was performed with the long sheaths, a BRK needle, and a SafeSept transeptal wire.  Catheters and sheaths were advanced safely into the left atrium.  A 64 electrode [employer] diagnostic electrophysiology catheter and a [employer] irrigated tip ablation catheter were used for pacing and recording in the left atrium, left atrial appendage, pulmonary veins and left ventricle at various times throughout the procedure.  We used the [employer] 3D electroanatomic mapping system in conjunction with these catheters to construct an electroanatomic shell of the left atrium, left atrial appendage, mitral valve annulus, interatrial  septum and pulmonary veins.  Left ventricular pacing and recording were performed by placing catheters safely and carefully across the mitral valve annulus to the left ventricle from the left atrium.  Adequate sensing and pacing thresholds were obtained from the left ventricle.  AV conduction ( antegrade ) as well as VA conduction ( retrograde ) were studied.  This was performed as a distinct addition to the diagnostic EP study.  Findings were conclusive for no accessory pathway and VA dissociation.    An EP study was performed.  Data obtained from this is listed in the below table:    Initial Study    Isuprel Washout Study      ID  [id number]     drive train / burst extra    QRS  88    Rhythm      QT  434    Atrial CL      R-R  866    Ventricular CL      AH  71    AVBCL      HV  52    AVNERP       drive train / burst extrastim   Slow Pway ERP      Rhythm     Fast Pway ERP      Atrial CL     VABCL conc? /  Dec?      Ventricular CL     VAERP      AVBCL  440    AERP      AVNERP  600  300   VERP      Slow Pway ERP     AP antegrade ERP      Fast Pway ERP     AP retrograde ERP      VABCL conc? /  Dec?   VAD          VAERP    Final Study      AERP      drive train / burst extrastim    VERP     Rhythm      AP antegrade ERP     Atrial CL      AP retrograde ERP     Ventricular CL           AVBCL     Isuprel Dose =      AVNERP       drive train / burst extrastim   Slow Pway ERP      Rhythm     Fast Pway ERP      Atrial CL     VABCL conc? /  Dec?      Ventricular CL     VAERP      AVBCL     AERP      AVNERP     VERP      Slow Pway ERP     AP antegrade ERP      Fast Pway ERP     AP retrograde ERP      VABCL conc? /  Dec?           VAERP           AERP           VERP           AP antegrade ERP           AP retrograde ERP                       Catheter ablation was performed to achieve pulmonary vein isolation.  A wide antral circumferential ablation approach was used.  Power was limited to 50 W on the posterior left atrium and 50 W  elsewhere.  Lesions were made using proprietary local impedance technology targeting impedance drop of 15 ohms on the posterior wall and 15 to 20 ohms elsewhere.   Lesions were placed 1-3 cm away from the ostia of the pulmonary veins and never in proximity to the esophagus.  Additional lesions were given within the antral lesion set at the larry between superior and inferior veins to achieve total isolation, when and where necessary.      The patient remained in atrial fibrillation.  Adjunctive ablation was performed to achieve isolation of the left atrial roof in linear fashion.  At no time was any ablation near the esophagus performed to achieve left atrial posterior wall isolation.    Patient remained once again in atrial fibrillation and additional linear ablation was performed on the inferior and posterior aspects of left atrium.  No ablation was performed anywhere near the esophagus.    Additional ablation was performed on the entire left atrial posterior wall to achieve left atrial posterior wall isolation.    During this we demonstrated that there was intermittent atrial tachycardia arising from the base of left atrial appendage.  The area around the base left atrial appendage was isolated targeting this as atrial tachycardia which we describes SVT #1.  Please note that this arrhythmia is a separate and distinct mechanism.    Following this ablation the patient's atrial fibrillation became organized and was far less rapid.    Transthoracic cardioversion was performed.  Sinus rhythm ensued.    At various points we manipulated the esophagus away from the posterior antrum where we were ablating using an Esosure esophageal manipulation device.  No lesions were given anywhere near the esophagus at any point.  Please also note that we visualized the full with of the esophagus with barium administered via the orogastric tube.   Patient tolerated this manipulation extremely well.    After completing the antral  "ablation lesion set, I interrogated the pulmonary veins using and documented pulmonary vein isolation.     Adenosine 12 mg was administered intravenously following ablation to test for other atrial and or ventricular arrhythmias.   Programmed stimulation was performed in an attempt to induce other and additional arrhythmias.  No arrhythmias were induced during administration and washout.    Turned our attention to the patient's typical right atrial flutter which had been documented to have recurred.  Typical flutter ablation was performed by my predecessor in 2019.  During atrial paced rhythm we performed activation mapping along the cavotricuspid isthmus and demonstrated that there was clear conduction throughout.  Additional ablation was performed here for typical right atrial flutter here referred to his SVT #2.  Ablation was performed with a 50 W power at a maximum duration of 15 seconds target impedance drop of 20 ohms.  Linear ablation was performed from the ventricular aspect of the caval aspect of the cavotricuspid isthmus.  Electrograms eliminated we then demonstrated bidirectional block with differential pacing maneuvers.  Satisfied that there was indeed durable cavotricuspid isthmus block at this point we decided to terminate the procedure due to acute success.    The ICE catheter revealed that there was no pericardial effusion.    Catheters and sheaths were then removed from the body.    Hemostasis was achieved with manual pressure after reversal of anticoagulation with protamine.  A hemostatic \"figure of eight\" suture was applied at the groin.  This suture is to be removed prior to the patient being discharged home.    Hemostasis was achieved with Vascade closure devices x 5.  Bedrest x2 hours.  Home tonight.    The patient was extubated in routine fashion and transferred to PACU in stable condition.    COMPLICATIONS: None    EBL: minimal    RADIATION EXPOSURE: 21 mGy over 3.4 minutes    LA PRESSURE: " 45/22 mmHg    KEY PROCEDURAL FINDINGS:  · Normal AV node and His-Purkinje system function  · Relative paucity of left atrial scar  · Massive left atrial hypertension  · Wide antral circumferential pulmonary vein isolation of all 4 pulmonary veins  · Linear ablation of left atrial roof and left atrial inferior posterior wall and additional ablation in between resulting in complete isolation of the entire left atrial posterior wall  · Ablation of the base of left atrial appendage    POST PROCEDURAL PLAN:    ·  Report was called the the PACU nurse responsible for the patients care.  · Uninterrupted anticoagulation for not less than 90 days unless specially instructed otherwise by myself or another member of our EP physician team.  Please note that the patient and the patient’s family have been extensively counseled about this critical requirement and have agreed to comply.  · Esophageal prophylaxis with proton pump inhibitor for 90 days.  · Anticipate discharge tomorrow.  · Medications were reconciled, and key changes in medications include: Discontinue diltiazem, institute Lasix 60 mg orally p.o. daily.  Additionally it is critical that this patient lose weight.  He knows this.  I think that there is a reasonable chance that he will be successful  · Patient and family instructed to call immediately for fever greater than 101.5 degrees F, hematemesis, increasing or severe chest pain, increasing shortness of breath, bleeding or other concerns.  Patient and family instructed that some chest discomfort is normal and is to be expected and that this should be expected to decrease over the first 3-4 days after the ablation procedure.  · The patient will be seen at our office per routine follow up.      Winston Clark DO, [facility], [facility]  Cardiac Electrophysiologist  [facility]

## 2022-02-23 NOTE — ANESTHESIA PREPROCEDURE EVALUATION
Anesthesia Evaluation                  Airway   Mallampati: II  Dental      Pulmonary    Cardiovascular     (+) hypertension, dysrhythmias,       Neuro/Psych  GI/Hepatic/Renal/Endo      Musculoskeletal     Abdominal    Substance History      OB/GYN          Other                        Anesthesia Plan    ASA 3     general     intravenous induction     Anesthetic plan, all risks, benefits, and alternatives have been provided, discussed and informed consent has been obtained with: patient.        CODE STATUS:

## 2022-02-23 NOTE — ANESTHESIA PROCEDURE NOTES
Airway  Urgency: elective    Date/Time: 2/23/2022 8:38 AM  Airway not difficult    General Information and Staff    Patient location during procedure: OR  Anesthesiologist: Eduard Lane MD  CRNA: Azalia Gomez CRNA    Indications and Patient Condition  Indications for airway management: airway protection    Preoxygenated: yes  MILS not maintained throughout  Mask difficulty assessment: 3 - difficult mask (inadequate, unstable or two providers) +/- NMBA    Final Airway Details  Final airway type: endotracheal airway      Successful airway: ETT  Cuffed: yes   Successful intubation technique: video laryngoscopy  Facilitating devices/methods: intubating stylet  Endotracheal tube insertion site: oral  Blade: Glidescope  Blade size: 4  ETT size (mm): 7.0  Cormack-Lehane Classification: grade I - full view of glottis  Placement verified by: chest auscultation and capnometry   Measured from: lips  ETT/EBT  to lips (cm): 20  Number of attempts at approach: 1  Assessment: lips, teeth, and gum same as pre-op and atraumatic intubation    Additional Comments  Negative epigastric sounds, Breath sound equal bilaterally with symmetric chest rise and fall

## 2022-02-23 NOTE — ANESTHESIA POSTPROCEDURE EVALUATION
Patient: Elvin Smith    Procedure Summary     Date: 02/23/22 Room / Location: [facility] CATH/EP LAB [facility] / [facility] EP INVASIVE LOCATION    Anesthesia Start: 0821 Anesthesia Stop:     Procedure: PVA (persistent), Rythmia, no meds to hold (N/A ) Diagnosis:       Persistent atrial fibrillation (HCC)      (AF)    Providers: Winston Clark DO Provider: Eduard Lane MD    Anesthesia Type: general ASA Status: 3          Anesthesia Type: general    Vitals  Vitals Value Taken Time   /74 02/23/22 1220   Temp     Pulse 91 02/23/22 1221   Resp     SpO2 93 % 02/23/22 1221   Vitals shown include unvalidated device data.    T 97F  RR 16    Anesthesia Post Evaluation

## 2022-02-24 ENCOUNTER — CALL CENTER PROGRAMS (OUTPATIENT)
Dept: CALL CENTER | Facility: HOSPITAL | Age: 72
End: 2022-02-24

## 2022-02-24 NOTE — OUTREACH NOTE
PCI/Device Survey      Responses   Facility patient discharged from? [facility]   Procedure date 02/23/22   Procedure (if device, specify in description) PCI   PCI site Groin,  Right,  Left   Performing MD Other (annotate)  [Dr. Winston Clark]   Attempt successful? Yes   Call start time 1003   Call end time 1011   Has the patient had any of the following symptoms since discharge? Shortness of breath  [SOB is at baseline]   Symptom comments Pt reports a cough   Is the patient taking prescribed medications: --  [ELIQUIS]   Nursing intervention Reminded to continue to take prescribed medications   Does the patient have any of the following symptoms related to the cath/surgical site? --  [None noted]   Does the patient have an appointment scheduled with the cardiologist? Yes   Appointment comments Appt with Madelin Mireles is on 3/23/22,  Appt with Dr. Clark is on 6/6/22   If the patient is a current smoker, are they able to teach back resources for cessation? Not a smoker   Did the patient feel prepared to go home on the same day as the procedure? Yes   Is the patient satisfied with the same day discharge process? Yes   PCI/Device call completed Yes          Penelope Campbell RN

## 2022-02-25 ENCOUNTER — TELEPHONE (OUTPATIENT)
Dept: CARDIOLOGY | Facility: CLINIC | Age: 72
End: 2022-02-25

## 2022-02-25 DIAGNOSIS — I48.19 PERSISTENT ATRIAL FIBRILLATION: Primary | ICD-10-CM

## 2022-02-25 NOTE — TELEPHONE ENCOUNTER
Patient phoned and reports clear productive cough since evening of 2/23.  Chest sore from coughing.  NAD, 02 sat =94%, temp=99,  home COVID test (-).  Unable to check BP and HR.  Dr Clark updated.  VO increase lasix to 60 mg bid over the weekend, come in Monday for BMP.  Patient verbalized understanding.  Lab order placed.    Barb Fuentes RN

## 2022-03-08 ENCOUNTER — TELEPHONE (OUTPATIENT)
Dept: CARDIOLOGY | Facility: CLINIC | Age: 72
End: 2022-03-08

## 2022-03-08 NOTE — TELEPHONE ENCOUNTER
Kasandra from [facility] called requesting cardiac clearance for patient to participate in a none surgical weight loss program. She was also wondering if there was any restrictions that patient should adhere to.    (p) [phone]  (f) [phone]

## 2022-03-09 NOTE — TELEPHONE ENCOUNTER
He had a normal stress test this past December. He is highly encouraged to lose weight from an EP standpoint.

## 2022-03-15 ENCOUNTER — OFFICE VISIT (OUTPATIENT)
Dept: PULMONOLOGY | Facility: CLINIC | Age: 72
End: 2022-03-15

## 2022-03-15 VITALS
RESPIRATION RATE: 16 BRPM | WEIGHT: 295.2 LBS | OXYGEN SATURATION: 97 % | TEMPERATURE: 97.3 F | HEIGHT: 72 IN | DIASTOLIC BLOOD PRESSURE: 80 MMHG | HEART RATE: 110 BPM | BODY MASS INDEX: 39.98 KG/M2 | SYSTOLIC BLOOD PRESSURE: 142 MMHG

## 2022-03-15 DIAGNOSIS — T78.3XXS ANGIOEDEMA, SEQUELA: ICD-10-CM

## 2022-03-15 DIAGNOSIS — J45.40 MODERATE PERSISTENT ASTHMA WITHOUT COMPLICATION: Primary | ICD-10-CM

## 2022-03-15 DIAGNOSIS — G47.30 SLEEP APNEA IN ADULT: ICD-10-CM

## 2022-03-15 DIAGNOSIS — I48.0 PAROXYSMAL A-FIB: ICD-10-CM

## 2022-03-15 DIAGNOSIS — J98.4 RESTRICTIVE LUNG DISEASE: ICD-10-CM

## 2022-03-15 PROBLEM — T78.3XXA ANGIOEDEMA: Status: ACTIVE | Noted: 2022-03-15

## 2022-03-15 PROCEDURE — 99214 OFFICE O/P EST MOD 30 MIN: CPT | Performed by: INTERNAL MEDICINE

## 2022-03-15 RX ORDER — PREDNISONE 20 MG/1
TABLET ORAL
COMMUNITY
Start: 2022-03-13 | End: 2022-03-23

## 2022-03-15 RX ORDER — OLOPATADINE HYDROCHLORIDE 665 UG/1
SPRAY NASAL
COMMUNITY
Start: 2022-03-13 | End: 2022-03-23

## 2022-03-15 RX ORDER — LEVOCETIRIZINE DIHYDROCHLORIDE 5 MG/1
5 TABLET, FILM COATED ORAL EVERY EVENING
COMMUNITY
Start: 2022-03-13

## 2022-03-15 NOTE — PROGRESS NOTES
"Pulmonary Office Follow Up      Subjective   Chief Complaint: Shortness of Breath    Elvin Smith is a 71 y.o. male is being seen in follow up for Asthma    History of Present Illness    Mr. Smith is a 72yo M who is followed for asthma. He was last seen in clinic on 12/23/21.     Since his last visit, he underwent ablation for Afib by Dr. Clark on 2/23/22. Lasix 60mg PO daily was started at this time.     He returns to clinic for follow up. He is currently on a Prednisone taper for a bought of Angioedema which he thinks was triggered by too many Keto based foods.     The following portions of the patient's history were reviewed and updated as appropriate: allergies, current medications, past family history, past medical history, past social history, past surgical history and problem list.    Review of Systems   Constitutional: Negative.    HENT: Negative.    Eyes: Negative.    Respiratory: Positive for shortness of breath.    Cardiovascular: Negative.    Gastrointestinal: Negative.    Endocrine: Negative.    Genitourinary: Negative.    Musculoskeletal: Negative.    Skin: Negative.    Allergic/Immunologic: Negative.    Neurological: Negative.    Hematological: Negative.    Psychiatric/Behavioral: Negative.          Objective   Blood pressure 142/80, pulse 110, temperature 97.3 °F (36.3 °C), temperature source Infrared, resp. rate 16, height 182.9 cm (72.01\"), weight 134 kg (295 lb 3.2 oz), SpO2 97 %.  Physical Exam  Vitals and nursing note reviewed.   Constitutional:       General: He is not in acute distress.     Appearance: He is well-developed. He is obese.   HENT:      Head: Normocephalic and atraumatic.   Eyes:      General: No scleral icterus.     Conjunctiva/sclera: Conjunctivae normal.      Pupils: Pupils are equal, round, and reactive to light.   Neck:      Thyroid: No thyromegaly.      Trachea: No tracheal deviation.   Cardiovascular:      Rate and Rhythm: Normal rate and regular rhythm.      Heart " sounds: Normal heart sounds.   Pulmonary:      Effort: Pulmonary effort is normal. No respiratory distress.      Breath sounds: Normal breath sounds.   Abdominal:      General: Bowel sounds are normal.      Palpations: Abdomen is soft.      Tenderness: There is no abdominal tenderness.   Musculoskeletal:         General: Normal range of motion.      Cervical back: Normal range of motion and neck supple.   Lymphadenopathy:      Cervical: No cervical adenopathy.   Skin:     General: Skin is warm and dry.      Findings: No erythema or rash.   Neurological:      Mental Status: He is alert and oriented to person, place, and time.      Motor: No abnormal muscle tone.      Coordination: Coordination normal.   Psychiatric:         Speech: Speech normal.         Behavior: Behavior normal.         Judgment: Judgment normal.         PFTs:  No new PFTs.     Imaging:  CTA chest 2/22/22 showed mild dependent atelectasis in the posterior lobes and left lower lobe atelectasis associated with an elevated left hemidiaphragm.     Assessment/Plan   Diagnoses and all orders for this visit:    1. Moderate persistent asthma without complication (Primary)    2. Restrictive lung disease    3. Paroxysmal A-fib (HCC)    4. Sleep apnea in adult    5. Angioedema, sequela        Discussion:  Mr. Smith is a 72yo M who is followed for shortness of breath.      1. Asthma  - His PFTs are indicative of asthma as he only smoked for approximately 2 years and has a significant response to bronchodilators.   -Continue Advair 500/50. Can consider step down therapy at his next visit if he remains controlled.   - PRN Albuterol.      2. Restrictive Lung Disease  - Likely secondary to chronically elevated left hemidiaphragm and obesity.   - CTA negative for pulmonary fibrosis.   - He is currently working with a nutritionist to try and lose weight.      3. Atrial Fibrillation  - S/p Ablation on 2/23/22.      4. KM  - Continue Cpap therapy.     5.  Angioedema  - He is currently on a Prednisone taper.   - I do not manage chronic angioedema as a Pulmonologist. I only deal with airway complications resulting from angioedema in a critical care manner. He will need to see an allergist/immunologist. [facility].     Follow up in 6 months. Call with any questions or concerns.       Lucretia V Case, DO  Pulmonary and Critical Care Medicine  Note Electronically Signed

## 2022-03-19 LAB
QT INTERVAL: 390 MS
QTC INTERVAL: 466 MS

## 2022-03-22 NOTE — PROGRESS NOTES
"[facility]  [facility]    Chief Complaint  Hospital Follow Up Visit (PVA), Establish Care, and Atrial Fibrillation    Subjective    History of Present Illness {CC  Problem List  Visit  Diagnosis   Encounters  Notes  Medications  Labs  Result Review Imaging  Media :23}     Elvin Smith is a 71 y.o. male with persistent atrial fibrillation, Hypertension, hyperlipidemia, asthma and sleep apnea who presents today as a hospital referral for atrial fibrillation status post pulmonary vein isolation.  Patient underwent successful ablation of persistent atrial fibrillation, focal atrial tachycardia and typical right atrial flutter on 2/23 with Dr. Clark.  Patient's was diltiazem discontinued and he was started on 60mg of Lasix daily.  He misunderstood and is still taking diltiazem.  He also is only taking 40 mg of Lasix.  He reports that after the ablation he was feeling well until Sunday he started having increasing fatigue.  He went to his PCP yesterday for a wellness visit and reports that he was back in A. fib.  He denies palpitations, but tells me that he never felt his A. fib, only felt symptoms of fatigue.  He reports he feels slightly better today.  He reports chronic shortness of breath and is followed by pulmonary for this.  No worsening.  No improvement on Lasix.  Recently started wearing his CPAP in January and reports some improvement in his fatigue and sleep         Objective     Vital Signs:   Vitals:    03/23/22 1238 03/23/22 1239 03/23/22 1240   BP: 134/79 145/85 132/73   BP Location: Right arm Left arm Left arm   Patient Position: Sitting Standing Sitting   Cuff Size: Adult Adult Adult   Pulse: 95 95 93   Resp: 13  13   Temp: 96.2 °F (35.7 °C) 96.2 °F (35.7 °C) 96.2 °F (35.7 °C)   TempSrc: Temporal Temporal Temporal   SpO2: 95% 97% 96%   Weight:   134 kg (295 lb)   Height:   182.9 cm (72\")     Body mass index is 40.01 kg/m².  Physical Exam  Vitals reviewed. "   Constitutional:       Appearance: Normal appearance.   HENT:      Head: Normocephalic.   Neck:      Vascular: No carotid bruit.   Cardiovascular:      Rate and Rhythm: Normal rate and regular rhythm.      Pulses: Normal pulses.      Heart sounds: Normal heart sounds, S1 normal and S2 normal. No murmur heard.  Pulmonary:      Effort: Pulmonary effort is normal. No respiratory distress.      Breath sounds: Normal breath sounds.   Chest:      Chest wall: No tenderness.   Abdominal:      General: Abdomen is flat.      Palpations: Abdomen is soft.   Musculoskeletal:      Cervical back: Neck supple.      Right lower le+ Edema present.      Left lower le+ Edema present.   Skin:     General: Skin is warm and dry.   Neurological:      General: No focal deficit present.      Mental Status: He is alert and oriented to person, place, and time. Mental status is at baseline.   Psychiatric:         Mood and Affect: Mood normal.         Behavior: Behavior normal.         Thought Content: Thought content normal.              Result Review  Data Reviewed:{ Labs  Result Review  Imaging  Med Tab  Media :23}   EP/CRM Study (2022 11:41)  ECG 12 Lead (2022 13:23)  Adult Transthoracic Echo Complete W/ Cont if Necessary Per Protocol (2021 13:47)  Stress test with myocardial perfusion one day (2021 09:25)  EKG today shows normal sinus rhythm with first-degree AV block, ventricular rate 95  Consultant notes EP            Assessment and Plan {CC Problem List  Visit Diagnosis  ROS  Review (Popup)  Health Maintenance  Quality  BestPractice  Medications  SmartSets  SnapShot Encounters  Media :23}   1. Persistent atrial fibrillation (HCC)  S/p ablation .  We will get his EKG from yesterday.  He currently is in normal sinus rhythm today.  I discussed with him that intermittent episodes of A. fib can be expected for the first several months after ablation.  Advised him that if he has persistent  symptoms to call me and let me know  Since he had a recent episode of A. fib and his heart rates are on the higher end of normal we will continue his diltiazem  Continue Eliquis  - ECG 12 Lead; Future    2. Typical atrial flutter (HCC)  Status post ablation 2/23  - ECG 12 Lead; Future    3. Essential hypertension  Appears controlled on diltiazem and losartan    4. Shortness of breath  Reports chronic.  No improvement with Lasix.  I have advised her to take an extra 20 mg of Lasix as needed for lower extremity edema, increased shortness of breath and sudden weight gain of 2 to 3 pounds in 24 hours  We will get recent labs drawn from a couple days ago per PCP    Keep upcoming appt with Dr. Clark    Follow Up {Instructions Charge Capture  Follow-up Communications :23}   No follow-ups on file.    Patient was given instructions and counseling regarding his condition or for health maintenance advice. Please see specific information pulled into the AVS if appropriate.  Advised to call the [facility] Center with any questions, concerns, or worsening symptoms.

## 2022-03-23 ENCOUNTER — HOSPITAL ENCOUNTER (OUTPATIENT)
Dept: CARDIOLOGY | Facility: HOSPITAL | Age: 72
Discharge: HOME | End: 2022-03-23

## 2022-03-23 ENCOUNTER — OFFICE VISIT (OUTPATIENT)
Dept: CARDIOLOGY | Facility: HOSPITAL | Age: 72
End: 2022-03-23

## 2022-03-23 VITALS
RESPIRATION RATE: 13 BRPM | SYSTOLIC BLOOD PRESSURE: 132 MMHG | HEART RATE: 93 BPM | HEIGHT: 72 IN | OXYGEN SATURATION: 96 % | BODY MASS INDEX: 39.96 KG/M2 | TEMPERATURE: 96.2 F | WEIGHT: 295 LBS | DIASTOLIC BLOOD PRESSURE: 73 MMHG

## 2022-03-23 DIAGNOSIS — I48.19 PERSISTENT ATRIAL FIBRILLATION: ICD-10-CM

## 2022-03-23 DIAGNOSIS — I48.19 PERSISTENT ATRIAL FIBRILLATION: Primary | ICD-10-CM

## 2022-03-23 DIAGNOSIS — I48.3 TYPICAL ATRIAL FLUTTER: ICD-10-CM

## 2022-03-23 DIAGNOSIS — R06.02 SHORTNESS OF BREATH: ICD-10-CM

## 2022-03-23 DIAGNOSIS — I10 ESSENTIAL HYPERTENSION: ICD-10-CM

## 2022-03-23 LAB
QT INTERVAL: 372 MS
QTC INTERVAL: 467 MS

## 2022-03-23 PROCEDURE — 93005 ELECTROCARDIOGRAM TRACING: CPT | Performed by: NURSE PRACTITIONER

## 2022-03-23 PROCEDURE — 93010 ELECTROCARDIOGRAM REPORT: CPT | Performed by: STUDENT IN AN ORGANIZED HEALTH CARE EDUCATION/TRAINING PROGRAM

## 2022-03-23 PROCEDURE — 99214 OFFICE O/P EST MOD 30 MIN: CPT | Performed by: NURSE PRACTITIONER

## 2022-03-23 RX ORDER — DILTIAZEM HYDROCHLORIDE 360 MG/1
360 CAPSULE, EXTENDED RELEASE ORAL DAILY
COMMUNITY

## 2022-06-06 ENCOUNTER — OFFICE VISIT (OUTPATIENT)
Dept: CARDIOLOGY | Facility: CLINIC | Age: 72
End: 2022-06-06

## 2022-06-06 VITALS
WEIGHT: 295 LBS | DIASTOLIC BLOOD PRESSURE: 90 MMHG | OXYGEN SATURATION: 97 % | HEIGHT: 72 IN | BODY MASS INDEX: 39.96 KG/M2 | SYSTOLIC BLOOD PRESSURE: 138 MMHG | HEART RATE: 109 BPM

## 2022-06-06 DIAGNOSIS — I48.19 PERSISTENT ATRIAL FIBRILLATION: ICD-10-CM

## 2022-06-06 DIAGNOSIS — Z79.01 LONG TERM (CURRENT) USE OF ANTICOAGULANTS: ICD-10-CM

## 2022-06-06 DIAGNOSIS — I48.3 TYPICAL ATRIAL FLUTTER: ICD-10-CM

## 2022-06-06 DIAGNOSIS — G47.30 SLEEP APNEA IN ADULT: ICD-10-CM

## 2022-06-06 DIAGNOSIS — I48.0 PAROXYSMAL ATRIAL FIBRILLATION: Primary | ICD-10-CM

## 2022-06-06 DIAGNOSIS — I10 ESSENTIAL HYPERTENSION: ICD-10-CM

## 2022-06-06 PROBLEM — T78.3XXA ANGIOEDEMA: Status: RESOLVED | Noted: 2022-03-15 | Resolved: 2022-06-06

## 2022-06-06 PROCEDURE — 99214 OFFICE O/P EST MOD 30 MIN: CPT | Performed by: PHYSICIAN ASSISTANT

## 2022-06-06 PROCEDURE — 93000 ELECTROCARDIOGRAM COMPLETE: CPT | Performed by: PHYSICIAN ASSISTANT

## 2022-06-06 RX ORDER — FUROSEMIDE 40 MG/1
60 TABLET ORAL DAILY
Qty: 45 TABLET | Refills: 0 | Status: SHIPPED | OUTPATIENT
Start: 2022-06-06 | End: 2022-06-06 | Stop reason: SDUPTHER

## 2022-06-06 RX ORDER — FUROSEMIDE 40 MG/1
60 TABLET ORAL DAILY
Qty: 135 TABLET | Refills: 2 | Status: SHIPPED | OUTPATIENT
Start: 2022-06-06

## 2022-06-06 RX ORDER — METFORMIN HYDROCHLORIDE 500 MG/1
1 TABLET, EXTENDED RELEASE ORAL DAILY
COMMUNITY
Start: 2022-05-29

## 2022-06-06 RX ORDER — OXYBUTYNIN CHLORIDE 15 MG/1
1 TABLET, EXTENDED RELEASE ORAL DAILY
COMMUNITY
Start: 2022-03-30 | End: 2022-08-01

## 2022-06-06 RX ORDER — TRIAMCINOLONE ACETONIDE 1 MG/G
1 CREAM TOPICAL DAILY
COMMUNITY
Start: 2022-05-02

## 2022-06-06 NOTE — PROGRESS NOTES
"        Encounter Date:06/06/2022      Patient ID: Elvin Smith is a 71 y.o. male.    Hakan Pena MD    Chief Complaint: Persistent atrial fibrillation (HCC)      PROBLEM LIST:  Patient Active Problem List    Diagnosis Date Noted   • Persistent atrial fibrillation (HCC) 06/06/2022   • Moderate persistent asthma without complication 12/23/2021   • Restrictive lung disease 12/23/2021   • Sleep apnea in adult 12/14/2021   • Long term (current) use of anticoagulants 06/25/2019   • Typical atrial flutter (HCC) 05/09/2019     Note Last Updated: 5/9/2019     · Noted during office visit with Dr. Hakan Pena on 5/7/2019  · CHADS Vasc = 2 (age, HTN)   · Status post cardioversion for typical flutter, 5/9/2019     • Essential hypertension 05/09/2019   • Hyperlipidemia LDL goal <100 05/09/2019   • BPH (benign prostatic hyperplasia) 05/09/2019     Note Last Updated: 5/9/2019     · Managed by Dr. Jack  · We will needs a \"saddle procedure\" once cleared by cardiology  · Reports questionable bladder cancer                 History of Present Illness  Patient presents today for follow-up with a history of atrial fibrillation and flutter.  He underwent PVI with adjunct of ablation of atrial tachycardia and typical right atrial flutter.  He presents today for initial follow-up.  States he is very disappointed.  He continues to have no stamina and severe exertional dyspnea.  States he has been in atrial fibrillation \"most of the time\" with only occasional days of sinus rhythm.  Blood pressure at home typically runs about 120 230 mmHg systolic.  He remains CPAP compliant.  His weight unfortunately is up about 7 pounds.  He states compliance with his current medical regimen reports no significant adverse side effects.    No Known Allergies    Current Outpatient Medications   Medication Instructions   • apixaban (ELIQUIS) 5 mg, Oral, Every 12 Hours Scheduled   • azelastine (ASTELIN) 0.1 % nasal spray 2 sprays, Nasal, As " "Needed, Use in each nostril as directed    • dilTIAZem (TIAZAC) 360 mg, Oral, Daily   • fluticasone (FLONASE) 50 MCG/ACT nasal spray 2 sprays, Nasal, As Needed   • fluticasone-salmeterol (ADVAIR) 500-50 MCG/DOSE DISKUS 1 puff, Inhalation, 2 Times Daily - RT   • furosemide (LASIX) not taking. 60 mg, Oral, Daily   • levocetirizine (XYZAL) 5 mg, Oral, Every Evening   • losartan (COZAAR) 25 mg, Oral, Every Morning   • metFORMIN ER (GLUCOPHAGE-XR) 500 MG 24 hr tablet 1 tablet, Oral, Daily   • omeprazole (PRILOSEC) 20 mg, Oral, Every Morning   • oxybutynin XL (DITROPAN XL) 15 MG 24 hr tablet 1 tablet, Oral, Daily   • simvastatin (ZOCOR) 40 mg, Oral, Every Morning   • triamcinolone (KENALOG) 0.1 % cream 1 application, Topical, Daily       .    Objective:     /90 (BP Location: Right arm, Patient Position: Sitting, Cuff Size: Adult)   Pulse 109   Ht 182.9 cm (72\")   Wt 134 kg (295 lb)   SpO2 97%   BMI 40.01 kg/m²    Body mass index is 40.01 kg/m².     Vitals reviewed.   Constitutional:       Appearance: Well-developed.   Pulmonary:      Effort: Pulmonary effort is normal. No respiratory distress.      Breath sounds: Normal breath sounds. No wheezing. No rales.      Comments: Bases clear  Chest:      Chest wall: Not tender to palpatation.   Cardiovascular:      Tachycardia present. Irregular rhythm.      Murmurs: There is no murmur.      No gallop. No click. No rub.   Pulses:     Intact distal pulses.   Edema:     Peripheral edema absent.   Musculoskeletal: Normal range of motion.       Lab Review:                 TSH    TSH 2/22/22   TSH 2.880                     ECG 12 Lead    Date/Time: 6/6/2022 9:09 AM  Performed by: Sebastian Garcia PA  Authorized by: Sebastian Garcia PA   Comparison: compared with previous ECG from 3/23/2022  Rhythm: atrial fibrillation  Ectopy: unifocal PVCs  Rate: tachycardic  BPM: 109  Conduction: conduction normal  ST Segments: ST segments normal  T Waves: T waves normal  QRS axis: " normal  Other: no other findings    Clinical impression: abnormal EKG  Comments: When compared with 3/23/2022 he is now in atrial fibrillation with RVR                       Assessment:      Diagnosis Plan   1.   Persistent atrial fibrillation (HCC)   symptomatic.  Having now paroxysmal episodes of atrial fibrillation since his ablation.    A. fib ablation procedure was notable for severe left atrial hypertension, absence of left atrial myopathy.  Extensive ablation was performed.  No recurrence of flutter.    At the time of discharge from his ablation procedure he and I discussed that it is imperative that he aggressively diurese himself and also maintain a strict sodium restricted diet.  Unfortunately neither of these have happened.  He eats most of his meals at restaurants and is not taking Lasix.  It is unsurprising that he is back in atrial fibrillation.    Discussed with him that it is critical that we aggressively address this.  A 3 pronged approach was discussed.:  #1 diuresis.  Reinstitute Lasix 60 mg p.o. once a day and reduce salt dietary intake.  #2 continue CPAP compliance.  He is very happy to wear his CPAP.  He likes to go a lot.  #3 continue to lose weight.  He is working with a weight loss program at [facility].  He had admits that he has backslid a little bit due to dietary indiscretion.  We discussed that his BMI is 40.    We will see him back in 2 months time.    Hold off antiarrhythmic drug therapy for now as I think that the most critical step is intervention in his left atrial hypertension.   2. Typical atrial flutter (HCC)   ablated.  No recurrence.   3. Essential hypertension   blood pressure high today.  Lasix as above.  Diuresis.  Volume reduction.   4. Long term (current) use of anticoagulants   as above.  Apixaban.  Tolerating it well.  Lifelong anticoagulation.     I spent 38 minutes in consultation with this patient which included more than 65% of this time in direct face-to-face  counseling, physical examination and discussion of my assessment and findings and shared decision making with the patient.    Winston Clark DO, FACC, RS  Cardiac Electrophysiologist  [facility] / [facility]

## 2022-08-01 ENCOUNTER — OFFICE VISIT (OUTPATIENT)
Dept: CARDIOLOGY | Facility: CLINIC | Age: 72
End: 2022-08-01

## 2022-08-01 VITALS
OXYGEN SATURATION: 95 % | BODY MASS INDEX: 39.71 KG/M2 | SYSTOLIC BLOOD PRESSURE: 122 MMHG | HEART RATE: 94 BPM | HEIGHT: 72 IN | WEIGHT: 293.2 LBS | DIASTOLIC BLOOD PRESSURE: 68 MMHG

## 2022-08-01 DIAGNOSIS — I10 ESSENTIAL HYPERTENSION: ICD-10-CM

## 2022-08-01 DIAGNOSIS — G47.30 SLEEP APNEA IN ADULT: ICD-10-CM

## 2022-08-01 DIAGNOSIS — Z79.01 LONG TERM (CURRENT) USE OF ANTICOAGULANTS: ICD-10-CM

## 2022-08-01 DIAGNOSIS — I48.19 PERSISTENT ATRIAL FIBRILLATION: Primary | ICD-10-CM

## 2022-08-01 DIAGNOSIS — I48.3 TYPICAL ATRIAL FLUTTER: ICD-10-CM

## 2022-08-01 DIAGNOSIS — J98.4 RESTRICTIVE LUNG DISEASE: ICD-10-CM

## 2022-08-01 PROCEDURE — 99214 OFFICE O/P EST MOD 30 MIN: CPT | Performed by: INTERNAL MEDICINE

## 2022-08-01 PROCEDURE — 93000 ELECTROCARDIOGRAM COMPLETE: CPT | Performed by: INTERNAL MEDICINE

## 2022-08-01 NOTE — PROGRESS NOTES
Cardiac Electrophysiology Outpatient Follow Up Note            [facility]    Follow Up Office Visit      Elvin Smith  [id number]  08/01/2022  [unfilled]  [unfilled]    Primary Care Physician: Hakan Pena MD    Referred By: No ref. provider found    Subjective     Chief Complaint:   Diagnoses and all orders for this visit:    1. Persistent atrial fibrillation (HCC) (Primary)    2. Typical atrial flutter (HCC)    3. Essential hypertension    4. Long term (current) use of anticoagulants    5. Sleep apnea in adult    6. Restrictive lung disease      Chief Complaint   Patient presents with   • Atrial Fibrillation       History of Present Illness:   Elvin Smith is a 71 y.o. male who presents to my electrophysiology clinic for follow up of the above complaints.  He says that he is in A. fib pretty much every day now.  He cannot do much.  Getting up and making his bed is arduous task.  [place] and really his activities are quite minimal.  He notes palpitations irregular heart rate.  He feels miserable..      Review of Systems:   Constitutional: No fevers or chills, no recent weight gain or weight loss or fatigue  Eyes: No visual loss, blurred vision, double vision, yellow sclerae.  ENT: No headaches, hearing loss, vertigo, congestion or sore throat.   Cardiovascular: Per HPI  Respiratory: No cough or wheezing, no sputum production, no hematemesis   Hematologic/Lymphatic: No anemia, abnormal bruising or bleeding. No history of DVT/PE.      Past Medical History:   Past Medical History:   Diagnosis Date   • Borderline diabetes     working with dietician    • Cancer (HCC) 05/2019    bladder; surgically removed    • Enlarged prostate     needs surgery once Eliquis can be discontinued    • GERD (gastroesophageal reflux disease)    • History of angioedema 2009    bouts in the last 10 years    •  History of MRSA infection 2009    skin; treated with IV antibiotics because having recurring infections for about a year   • Skin cancer     face       Past Surgical History:   Past Surgical History:   Procedure Laterality Date   • ANTERIOR CERVICAL FUSION     • BASAL CELL CARCINOMA EXCISION  2021    left ear   • CARDIAC ELECTROPHYSIOLOGY PROCEDURE N/A 2019    Procedure: Ablation atrial flutter;  Surgeon: Vivek Mercedes MD;  Location:  [facility];  Service: Cardiovascular   • CARDIAC ELECTROPHYSIOLOGY PROCEDURE N/A 2022    Procedure: PVA (persistent), Rythmia, no meds to hold;  Surgeon: Winston Clark DO;  Location:  [facility];  Service: Cardiovascular;  Laterality: N/A;   • CARDIOVERSION  , 2019    x2   • COLONOSCOPY     • CYSTOSCOPY BLADDER BIOPSY     • EYE SURGERY Bilateral     cataract extraction    • HERNIA REPAIR Bilateral     inguinal    • PROSTATE SURGERY     • SKIN BIOPSY      skin cancer removed from face        Family History:   Family History   Problem Relation Age of Onset   • Heart failure Mother    • Cancer Sister    • Alcohol abuse Brother    • No Known Problems Brother        Social History:   Social History     Socioeconomic History   • Marital status: Single   Tobacco Use   • Smoking status: Former Smoker     Packs/day: 1.00     Years: 3.00     Pack years: 3.00     Types: Cigarettes     Quit date:      Years since quittin.6   • Smokeless tobacco: Never Used   Vaping Use   • Vaping Use: Never used   Substance and Sexual Activity   • Alcohol use: Yes     Comment: occas   • Drug use: No   • Sexual activity: Defer       Medications:     Current Outpatient Medications:   •  apixaban (ELIQUIS) 5 MG tablet tablet, Take 5 mg by mouth Every 12 (Twelve) Hours., Disp: , Rfl:   •  azelastine (ASTELIN) 0.1 % nasal spray, 2 sprays into the nostril(s) as directed by provider As Needed. Use in each nostril as directed, Disp: , Rfl:   •  dilTIAZem  "(TIAZAC) 360 MG 24 hr capsule, Take 360 mg by mouth Daily., Disp: , Rfl:   •  fluticasone (FLONASE) 50 MCG/ACT nasal spray, 2 sprays into the nostril(s) as directed by provider As Needed for Rhinitis., Disp: , Rfl:   •  fluticasone-salmeterol (ADVAIR) 500-50 MCG/DOSE DISKUS, Inhale 1 puff 2 (Two) Times a Day., Disp: 180 each, Rfl: 1  •  furosemide (LASIX) 40 MG tablet, Take 1.5 tablets by mouth Daily., Disp: 135 tablet, Rfl: 2  •  levocetirizine (XYZAL) 5 MG tablet, Take 5 mg by mouth Every Evening., Disp: , Rfl:   •  losartan (COZAAR) 25 MG tablet, Take 25 mg by mouth Every Morning., Disp: , Rfl:   •  metFORMIN ER (GLUCOPHAGE-XR) 500 MG 24 hr tablet, Take 1 tablet by mouth Daily., Disp: , Rfl:   •  omeprazole (priLOSEC) 20 MG capsule, Take 20 mg by mouth Every Morning., Disp: , Rfl:   •  simvastatin (ZOCOR) 40 MG tablet, Take 40 mg by mouth Every Morning., Disp: , Rfl:   •  triamcinolone (KENALOG) 0.1 % cream, Apply 1 application topically to the appropriate area as directed Daily., Disp: , Rfl:     Allergies:   No Known Allergies    Objective   Vital Signs:   Vitals:    08/01/22 0910   BP: 122/68   BP Location: Right arm   Patient Position: Sitting   Pulse: 94   SpO2: 95%   Weight: 133 kg (293 lb 3.2 oz)   Height: 182.9 cm (72\")       PHYSICAL EXAM  General appearance: Awake, alert, cooperative  Head: Normocephalic, without obvious abnormality, atraumatic  Eyes: Conjunctivae/corneas clear, EOMs intact  Neck: no adenopathy, no carotid bruit, no JVD and thyroid: not enlarged  Lungs: clear to auscultation bilaterally and no rhonchi or crackles\", ' symmetric  Heart: irregularly irregular rhythm  Abdomen: Soft, non-tender, bowel sounds normal,  no organomegaly  Extremities: extremities normal, atraumatic, no cyanosis or edema  Skin: Skin color, turgor normal, no rashes or lesions  Neurologic: Grossly normal     Lab Results   Component Value Date    GLUCOSE 137 (H) 02/22/2022    CALCIUM 9.2 02/22/2022     " 02/22/2022    K 4.3 02/22/2022    CO2 26.0 02/22/2022     02/22/2022    BUN 15 02/22/2022    CREATININE 0.94 02/22/2022    EGFRIFNONA 79 02/22/2022    BCR 16.0 02/22/2022    ANIONGAP 11.0 02/22/2022     Lab Results   Component Value Date    WBC 6.13 02/22/2022    HGB 15.2 02/22/2022    HCT 46.8 02/22/2022    MCV 87.2 02/22/2022     02/22/2022     No results found for: INR, PROTIME  Lab Results   Component Value Date    TSH 2.880 02/22/2022       Cardiac Testing:     I personally viewed and interpreted the patient's EKG/Telemetry/lab data      ECG 12 Lead    Date/Time: 8/1/2022 9:47 AM  Performed by: Winston Clark DO  Authorized by: Winston Clark DO   Comparison: compared with previous ECG   Similar to previous ECG  Rhythm: atrial fibrillation            Tobacco Cessation: N/A  Obstructive Sleep Apnea Screening: Completed    Assessment & Plan    Diagnoses and all orders for this visit:    1. Persistent atrial fibrillation (HCC) (Primary)    2. Typical atrial flutter (HCC)    3. Essential hypertension    4. Long term (current) use of anticoagulants    5. Sleep apnea in adult    6. Restrictive lung disease         Diagnosis Plan   1. Persistent atrial fibrillation (HCC)   symptomatic persistent atrial fibrillation.    Left atrial ablation February 2022.  At that time we discovered significant left atrial hypertension left atrial myopathy.  Extensive ablation procedure at that time did not result in durable symptomatic relief.  He is back in A. fib today.    Discussed with him the option of continued rhythm control with an additional ablation.  We also discussed the option of rate control either with pharmacotherapy or AV node ablation and leadless cardiac pacemaker.    At this time I think the shortest and best route to success for him is a AV node ablation and leadless cardiac pacemaker.  He is in A. fib every day essentially at this point.  In combination with his severe left atrial hypertension is  lung disease is an additional risk factor and I am not enthused about a repeat left atrial ablation.    I drew diagrams today.  All of his questions were answered.  I think at this point he is can go home and give it some thought.  He will call me next week and let me know if he wished to proceed with a leadless cardiac pacemaker and AV node ablation.   2. Typical atrial flutter (HCC)   ablated.  No recurrence.   3. Essential hypertension   blood pressure better with aggressive diuresis.   4. Long term (current) use of anticoagulants   anticoagulation lifelong.  Apixaban.   5. Sleep apnea in adult   CPAP compliant.   6. Restrictive lung disease   as above.  Significant comorbidity for rhythm control.     Body mass index is 39.77 kg/m².    I spent 47 minutes in consultation with this patient which included more than 65% of this time in direct face-to-face counseling, physical examination and discussion of my assessment and findings and shared decision making with the patient.  The remainder of the time not spent face to face was performing one, some or all of the following actions:  preparing to see this patient ( eg. Review of tests),  ordering medications, tests or procedures ), care coordination, discussion of the plan with other healthcare providers, documenting clinical information in Epic well as independently interpreting results and communicating results to patient, family and or caregiver.  All time noted occurred on the date of service.    Follow Up:       Thank you for allowing me to participate in the care of your patient. Please to not hesitate to contact me with additional questions or concerns.      Winston Clark, DO, FACC, RS  Cardiac Electrophysiologist  [facility] / [facility]

## 2022-12-11 ENCOUNTER — APPOINTMENT (OUTPATIENT)
Dept: GENERAL RADIOLOGY | Facility: HOSPITAL | Age: 72
End: 2022-12-11

## 2022-12-11 ENCOUNTER — HOSPITAL ENCOUNTER (EMERGENCY)
Facility: HOSPITAL | Age: 72
Discharge: HOME | End: 2022-12-11
Attending: EMERGENCY MEDICINE | Admitting: EMERGENCY MEDICINE

## 2022-12-11 VITALS
DIASTOLIC BLOOD PRESSURE: 77 MMHG | HEIGHT: 72 IN | HEART RATE: 77 BPM | SYSTOLIC BLOOD PRESSURE: 126 MMHG | TEMPERATURE: 97.8 F | RESPIRATION RATE: 20 BRPM | OXYGEN SATURATION: 94 % | BODY MASS INDEX: 39.96 KG/M2 | WEIGHT: 295 LBS

## 2022-12-11 DIAGNOSIS — Z86.79 HISTORY OF ATRIAL FIBRILLATION: ICD-10-CM

## 2022-12-11 DIAGNOSIS — R06.02 SHORTNESS OF BREATH: ICD-10-CM

## 2022-12-11 DIAGNOSIS — U07.1 COVID-19: Primary | ICD-10-CM

## 2022-12-11 LAB
ALBUMIN SERPL-MCNC: 4.3 G/DL (ref 3.5–5.2)
ALBUMIN/GLOB SERPL: 1.3 G/DL
ALP SERPL-CCNC: 39 U/L (ref 39–117)
ALT SERPL W P-5'-P-CCNC: 33 U/L (ref 1–41)
ANION GAP SERPL CALCULATED.3IONS-SCNC: 10 MMOL/L (ref 5–15)
AST SERPL-CCNC: 30 U/L (ref 1–40)
BASOPHILS # BLD AUTO: 0.02 10*3/MM3 (ref 0–0.2)
BASOPHILS NFR BLD AUTO: 0.4 % (ref 0–1.5)
BILIRUB SERPL-MCNC: 0.8 MG/DL (ref 0–1.2)
BUN SERPL-MCNC: 12 MG/DL (ref 8–23)
BUN/CREAT SERPL: 10.7 (ref 7–25)
CALCIUM SPEC-SCNC: 9 MG/DL (ref 8.6–10.5)
CHLORIDE SERPL-SCNC: 104 MMOL/L (ref 98–107)
CO2 SERPL-SCNC: 26 MMOL/L (ref 22–29)
CREAT SERPL-MCNC: 1.12 MG/DL (ref 0.76–1.27)
DEPRECATED RDW RBC AUTO: 42.4 FL (ref 37–54)
EGFRCR SERPLBLD CKD-EPI 2021: 68.9 ML/MIN/1.73
EOSINOPHIL # BLD AUTO: 0.11 10*3/MM3 (ref 0–0.4)
EOSINOPHIL NFR BLD AUTO: 2.4 % (ref 0.3–6.2)
ERYTHROCYTE [DISTWIDTH] IN BLOOD BY AUTOMATED COUNT: 12.9 % (ref 12.3–15.4)
FLUAV RNA RESP QL NAA+PROBE: NOT DETECTED
FLUBV RNA RESP QL NAA+PROBE: NOT DETECTED
GLOBULIN UR ELPH-MCNC: 3.2 GM/DL
GLUCOSE SERPL-MCNC: 101 MG/DL (ref 65–99)
HCT VFR BLD AUTO: 47.9 % (ref 37.5–51)
HGB BLD-MCNC: 16.2 G/DL (ref 13–17.7)
IMM GRANULOCYTES # BLD AUTO: 0.04 10*3/MM3 (ref 0–0.05)
IMM GRANULOCYTES NFR BLD AUTO: 0.9 % (ref 0–0.5)
LYMPHOCYTES # BLD AUTO: 1.45 10*3/MM3 (ref 0.7–3.1)
LYMPHOCYTES NFR BLD AUTO: 31.3 % (ref 19.6–45.3)
MCH RBC QN AUTO: 30.3 PG (ref 26.6–33)
MCHC RBC AUTO-ENTMCNC: 33.8 G/DL (ref 31.5–35.7)
MCV RBC AUTO: 89.5 FL (ref 79–97)
MONOCYTES # BLD AUTO: 0.52 10*3/MM3 (ref 0.1–0.9)
MONOCYTES NFR BLD AUTO: 11.2 % (ref 5–12)
NEUTROPHILS NFR BLD AUTO: 2.5 10*3/MM3 (ref 1.7–7)
NEUTROPHILS NFR BLD AUTO: 53.8 % (ref 42.7–76)
NRBC BLD AUTO-RTO: 0 /100 WBC (ref 0–0.2)
NT-PROBNP SERPL-MCNC: 551.9 PG/ML (ref 0–900)
PLATELET # BLD AUTO: 189 10*3/MM3 (ref 140–450)
PMV BLD AUTO: 10.2 FL (ref 6–12)
POTASSIUM SERPL-SCNC: 4.4 MMOL/L (ref 3.5–5.2)
PROT SERPL-MCNC: 7.5 G/DL (ref 6–8.5)
RBC # BLD AUTO: 5.35 10*6/MM3 (ref 4.14–5.8)
SARS-COV-2 RNA RESP QL NAA+PROBE: DETECTED
SODIUM SERPL-SCNC: 140 MMOL/L (ref 136–145)
TROPONIN T SERPL-MCNC: <0.01 NG/ML (ref 0–0.03)
WBC NRBC COR # BLD: 4.64 10*3/MM3 (ref 3.4–10.8)

## 2022-12-11 PROCEDURE — 71046 X-RAY EXAM CHEST 2 VIEWS: CPT

## 2022-12-11 PROCEDURE — 36415 COLL VENOUS BLD VENIPUNCTURE: CPT

## 2022-12-11 PROCEDURE — 80053 COMPREHEN METABOLIC PANEL: CPT | Performed by: EMERGENCY MEDICINE

## 2022-12-11 PROCEDURE — 87636 SARSCOV2 & INF A&B AMP PRB: CPT | Performed by: EMERGENCY MEDICINE

## 2022-12-11 PROCEDURE — 93005 ELECTROCARDIOGRAM TRACING: CPT | Performed by: EMERGENCY MEDICINE

## 2022-12-11 PROCEDURE — 84484 ASSAY OF TROPONIN QUANT: CPT | Performed by: EMERGENCY MEDICINE

## 2022-12-11 PROCEDURE — 99283 EMERGENCY DEPT VISIT LOW MDM: CPT

## 2022-12-11 PROCEDURE — 85025 COMPLETE CBC W/AUTO DIFF WBC: CPT | Performed by: EMERGENCY MEDICINE

## 2022-12-11 PROCEDURE — 83880 ASSAY OF NATRIURETIC PEPTIDE: CPT | Performed by: EMERGENCY MEDICINE

## 2022-12-11 NOTE — ED PROVIDER NOTES
[facility]    EMERGENCY DEPARTMENT ENCOUNTER      Pt Name: Elvin Smith  MRN: [id number]  YOB: 1950  Date of evaluation: 12/11/2022  Provider: Shabbir Dixon MD    CHIEF COMPLAINT       Chief Complaint   Patient presents with   • Flu Symptoms         HISTORY OF PRESENT ILLNESS  (Location/Symptom, Timing/Onset, Context/Setting, Quality, Duration, Modifying Factors, Severity.)   Elvin Smith is a 72 y.o. male who presents to the emergency department with progressively worsening cough over the course the past week.  Patient has had subjective fever and chills at home as well as cough productive of green sputum.  He notes that over the past 2 days he has been more short of breath.  His PCP had been treating him for the flu but started him on Augmentin yesterday due to concern for developing pneumonia.  He denies any associated chest pain, abdominal pain, vomiting, or diarrhea.  Has history of A. fib and is currently on Eliquis.  No history of VTE as well as any recent surgery, travel, swelling or pain in the legs.      Nursing notes were reviewed.    REVIEW OF SYSTEMS    (2-9 systems for level 4, 10 or more for level 5)   ROS:  General: Fever, chills  Cardiovascular:  No chest pain, no palpitations  Respiratory: Shortness of breath, cough  Gastrointestinal:  No pain, no nausea, no vomiting, no diarrhea  Musculoskeletal:  No muscle pain, no joint pain  Skin:  No rash  Neurologic:  No speech problems, no headache, no extremity numbness, no extremity tingling, no extremity weakness  Psychiatric:  No anxiety  Genitourinary:  No dysuria, no hematuria    Except as noted above the remainder of the review of systems was reviewed and negative.       PAST MEDICAL HISTORY     Past Medical History:   Diagnosis Date   • Borderline diabetes     working with dietician    • Cancer (HCC) 05/2019    bladder; surgically removed    • Enlarged prostate     needs surgery once Eliquis can be discontinued    • GERD  (gastroesophageal reflux disease)    • History of angioedema 2009    bouts in the last 10 years    • History of MRSA infection 2009    skin; treated with IV antibiotics because having recurring infections for about a year   • Skin cancer     face         SURGICAL HISTORY       Past Surgical History:   Procedure Laterality Date   • ANTERIOR CERVICAL FUSION     • BASAL CELL CARCINOMA EXCISION  03/2021    left ear   • CARDIAC ELECTROPHYSIOLOGY PROCEDURE N/A 5/30/2019    Procedure: Ablation atrial flutter;  Surgeon: Vivek Mercedes MD;  Location:  [facility];  Service: Cardiovascular   • CARDIAC ELECTROPHYSIOLOGY PROCEDURE N/A 2/23/2022    Procedure: PVA (persistent), Rythmia, no meds to hold;  Surgeon: Winston Clark DO;  Location:  [facility];  Service: Cardiovascular;  Laterality: N/A;   • CARDIOVERSION  2017, 2019    x2   • COLONOSCOPY     • CYSTOSCOPY BLADDER BIOPSY     • EYE SURGERY Bilateral     cataract extraction    • HERNIA REPAIR Bilateral     inguinal    • PROSTATE SURGERY     • SKIN BIOPSY      skin cancer removed from face          CURRENT MEDICATIONS     No current facility-administered medications for this encounter.    Current Outpatient Medications:   •  apixaban (ELIQUIS) 5 MG tablet tablet, Take 5 mg by mouth Every 12 (Twelve) Hours., Disp: , Rfl:   •  azelastine (ASTELIN) 0.1 % nasal spray, 2 sprays into the nostril(s) as directed by provider As Needed. Use in each nostril as directed, Disp: , Rfl:   •  dilTIAZem (TIAZAC) 360 MG 24 hr capsule, Take 360 mg by mouth Daily., Disp: , Rfl:   •  fluticasone (FLONASE) 50 MCG/ACT nasal spray, 2 sprays into the nostril(s) as directed by provider As Needed for Rhinitis., Disp: , Rfl:   •  fluticasone-salmeterol (ADVAIR) 500-50 MCG/DOSE DISKUS, Inhale 1 puff 2 (Two) Times a Day., Disp: 180 each, Rfl: 1  •  furosemide (LASIX) 40 MG tablet, Take 1.5 tablets by mouth Daily., Disp: 135 tablet, Rfl: 2  •  levocetirizine (XYZAL) 5  "MG tablet, Take 5 mg by mouth Every Evening., Disp: , Rfl:   •  losartan (COZAAR) 25 MG tablet, Take 25 mg by mouth Every Morning., Disp: , Rfl:   •  metFORMIN ER (GLUCOPHAGE-XR) 500 MG 24 hr tablet, Take 1 tablet by mouth Daily., Disp: , Rfl:   •  omeprazole (priLOSEC) 20 MG capsule, Take 20 mg by mouth Every Morning., Disp: , Rfl:   •  simvastatin (ZOCOR) 40 MG tablet, Take 40 mg by mouth Every Morning., Disp: , Rfl:   •  triamcinolone (KENALOG) 0.1 % cream, Apply 1 application topically to the appropriate area as directed Daily., Disp: , Rfl:     ALLERGIES     Patient has no known allergies.    FAMILY HISTORY       Family History   Problem Relation Age of Onset   • Heart failure Mother    • Cancer Sister    • Alcohol abuse Brother    • No Known Problems Brother           SOCIAL HISTORY       Social History     Socioeconomic History   • Marital status: Single   Tobacco Use   • Smoking status: Former     Packs/day: 1.00     Years: 3.00     Pack years: 3.00     Types: Cigarettes     Quit date:      Years since quittin.9   • Smokeless tobacco: Never   Vaping Use   • Vaping Use: Never used   Substance and Sexual Activity   • Alcohol use: Yes     Comment: occas   • Drug use: No   • Sexual activity: Defer         PHYSICAL EXAM    (up to 7 for level 4, 8 or more for level 5)     Vitals:    22 1425   BP: 126/77   BP Location: Left arm   Patient Position: Sitting   Pulse: 77   Resp: 20   Temp: 97.8 °F (36.6 °C)   TempSrc: Oral   SpO2: 94%   Weight: 134 kg (295 lb)   Height: 182.9 cm (72\")       Physical Exam  General: Awake, alert, no acute distress.  HEENT: Conjunctivae normal.  Neck: Trachea midline.  Cardiac: Heart regular rate, rhythm, no murmurs, rubs, or gallops  Lungs: No tachypnea.  Diffusely coarse.  No focal findings or wheezes.  Chest wall: There is no tenderness to palpation over the chest wall or over ribs  Abdomen: Abdomen is soft, nontender, nondistended. There are no firm or pulsatile masses, " no rebound rigidity or guarding.   Musculoskeletal: No deformity.  Neuro: Alert and oriented x 4.  Dermatology: Skin is warm and dry  Psych: Mentation is grossly normal, cognition is grossly normal. Affect is appropriate.        DIAGNOSTIC RESULTS     EKG: All EKGs are interpreted by the Emergency Department Physician who either signs or Co-signs this chart in the absence of a cardiologist.    ECG 12 Lead Dyspnea   Preliminary Result   Test Reason : Dyspnea   Blood Pressure :   */*   mmHG   Vent. Rate :  77 BPM     Atrial Rate :  77 BPM      P-R Int : 218 ms          QRS Dur :  86 ms       QT Int : 454 ms       P-R-T Axes :  18  -3  31 degrees      QTc Int : 513 ms      Sinus rhythm with 1st degree AV block   Prolonged QT   Abnormal ECG   When compared with ECG of 11-DEC-2022 14:47, (Unconfirmed)   QRS axis shifted right   Criteria for Inferior infarct are no longer present   Non-specific change in ST segment in Lateral leads      Referred By:            Confirmed By:       ECG 12 Lead Dyspnea   Preliminary Result   Test Reason : Dyspnea   Blood Pressure :   */*   mmHG   Vent. Rate :  77 BPM     Atrial Rate :  77 BPM      P-R Int : 202 ms          QRS Dur :  90 ms       QT Int : 454 ms       P-R-T Axes :   * 187 149 degrees      QTc Int : 513 ms      ** Suspect arm lead reversal, interpretation assumes no reversal   Normal sinus rhythm   Lateral infarct , age undetermined   Inferior infarct , age undetermined   Prolonged QT   Abnormal ECG   When compared with ECG of 23-MAR-2022 12:49,   Questionable change in QRS axis   Lateral infarct is now present   Inferior infarct is now present   Non-specific change in ST segment in Lateral leads      Referred By:            Confirmed By:           RADIOLOGY:   Non-plain film images such as CT, Ultrasound and MRI are read by the radiologist. Plain radiographic images are visualized and preliminarily interpreted by the emergency physician with the below findings:      [x]  Radiologist's Report Reviewed:  XR Chest 2 View   Final Result   Mild chronic interstitial changes of the lung fields without   evidence of acute cardiopulmonary abnormality.        This report was finalized on 12/11/2022 3:57 PM by Sebastian Bullock.                ED BEDSIDE ULTRASOUND:   Performed by ED Physician - none    LABS:    I have reviewed and interpreted all of the currently available lab results from this visit (if applicable):  Results for orders placed or performed during the hospital encounter of 12/11/22   COVID-19 and FLU A/B PCR - Swab, Nasopharynx    Specimen: Nasopharynx; Swab   Result Value Ref Range    COVID19 Detected (C) Not Detected - Ref. Range    Influenza A PCR Not Detected Not Detected    Influenza B PCR Not Detected Not Detected   Comprehensive Metabolic Panel    Specimen: Blood   Result Value Ref Range    Glucose 101 (H) 65 - 99 mg/dL    BUN 12 8 - 23 mg/dL    Creatinine 1.12 0.76 - 1.27 mg/dL    Sodium 140 136 - 145 mmol/L    Potassium 4.4 3.5 - 5.2 mmol/L    Chloride 104 98 - 107 mmol/L    CO2 26.0 22.0 - 29.0 mmol/L    Calcium 9.0 8.6 - 10.5 mg/dL    Total Protein 7.5 6.0 - 8.5 g/dL    Albumin 4.30 3.50 - 5.20 g/dL    ALT (SGPT) 33 1 - 41 U/L    AST (SGOT) 30 1 - 40 U/L    Alkaline Phosphatase 39 39 - 117 U/L    Total Bilirubin 0.8 0.0 - 1.2 mg/dL    Globulin 3.2 gm/dL    A/G Ratio 1.3 g/dL    BUN/Creatinine Ratio 10.7 7.0 - 25.0    Anion Gap 10.0 5.0 - 15.0 mmol/L    eGFR 68.9 >60.0 mL/min/1.73   Troponin    Specimen: Blood   Result Value Ref Range    Troponin T <0.010 0.000 - 0.030 ng/mL   BNP    Specimen: Blood   Result Value Ref Range    proBNP 551.9 0.0 - 900.0 pg/mL   CBC Auto Differential    Specimen: Blood   Result Value Ref Range    WBC 4.64 3.40 - 10.80 10*3/mm3    RBC 5.35 4.14 - 5.80 10*6/mm3    Hemoglobin 16.2 13.0 - 17.7 g/dL    Hematocrit 47.9 37.5 - 51.0 %    MCV 89.5 79.0 - 97.0 fL    MCH 30.3 26.6 - 33.0 pg    MCHC 33.8 31.5 - 35.7 g/dL    RDW 12.9 12.3 - 15.4 %     "RDW-SD 42.4 37.0 - 54.0 fl    MPV 10.2 6.0 - 12.0 fL    Platelets 189 140 - 450 10*3/mm3    Neutrophil % 53.8 42.7 - 76.0 %    Lymphocyte % 31.3 19.6 - 45.3 %    Monocyte % 11.2 5.0 - 12.0 %    Eosinophil % 2.4 0.3 - 6.2 %    Basophil % 0.4 0.0 - 1.5 %    Immature Grans % 0.9 (H) 0.0 - 0.5 %    Neutrophils, Absolute 2.50 1.70 - 7.00 10*3/mm3    Lymphocytes, Absolute 1.45 0.70 - 3.10 10*3/mm3    Monocytes, Absolute 0.52 0.10 - 0.90 10*3/mm3    Eosinophils, Absolute 0.11 0.00 - 0.40 10*3/mm3    Basophils, Absolute 0.02 0.00 - 0.20 10*3/mm3    Immature Grans, Absolute 0.04 0.00 - 0.05 10*3/mm3    nRBC 0.0 0.0 - 0.2 /100 WBC   ECG 12 Lead Dyspnea   Result Value Ref Range    QT Interval 454 ms    QTC Interval 513 ms   ECG 12 Lead Dyspnea   Result Value Ref Range    QT Interval 454 ms    QTC Interval 513 ms        All other labs were within normal range or not returned as of this dictation.      EMERGENCY DEPARTMENT COURSE and DIFFERENTIAL DIAGNOSIS/MDM:   Vitals:    Vitals:    12/11/22 1425   BP: 126/77   BP Location: Left arm   Patient Position: Sitting   Pulse: 77   Resp: 20   Temp: 97.8 °F (36.6 °C)   TempSrc: Oral   SpO2: 94%   Weight: 134 kg (295 lb)   Height: 182.9 cm (72\")       ED Course as of 12/11/22 1613   Sun Dec 11, 2022   1610 Patient presentation most consistent with COVID-19.  Chest x-ray demonstrates no evidence of pneumonia or other acute intrathoracic process.  Vital signs within normal limits and he has no respiratory difficulty on exam.  There is no tachypnea, leukocytosis, tachycardia, or hypoxia.  He has home pulse oximeter and I have informed him to come back to the emergency department if this gets below 90%.  He understands that he is to follow-up closely with his PCP. [NS]      ED Course User Index  [NS] Shabbir Dixon MD       Patient presents with acute respiratory illness.  Initial vital signs are unremarkable without any tachycardia or hypoxia.  Symptoms most consistent with acute " infectious process.  As such, feel that other etiologies such as PE are less likely.  Patient has no risk factors for PE and is also anticoagulated, making this very unlikely.  Also feel that atypical ACS presentation is very unlikely, however will obtain screening ECG and troponin along with additional blood work.  Will obtain chest x-ray to evaluate for any evidence of infiltrate as well as COVID and flu swabs.  Will reassess once we obtain diagnostic results.    I had a discussion with the patient/family regarding diagnosis, diagnostic results, treatment plan, and medications.  The patient/family indicated understanding of these instructions.  I spent adequate time at the bedside preceding discharge necessary to personally discuss the aftercare instructions, giving patient education, providing explanations of the results of our evaluations/findings, and my decision making to assure that the patient/family understand the plan of care.  Time was allotted to answer questions at that time and throughout the ED course.  Emphasis was placed on timely follow-up after discharge.  I also discussed the potential for the development of an acute emergent condition requiring further evaluation, admission, or even surgical intervention. I discussed that we found nothing during the visit today indicating the need for further workup, admission, or the presence of an unstable medical condition.  I encouraged the patient to return to the emergency department immediately for ANY concerns, worsening, new complaints, or if symptoms persist and unable to seek follow-up in a timely fashion.  The patient/family expressed understanding and agreement with this plan.  The patient will follow-up with their PCP in 1-2 days for reevaluation.       MEDICATIONS ADMINISTERED IN ED:  Medications - No data to display    PROCEDURES:  Procedures    CRITICAL CARE TIME    Total Critical Care time was 0 minutes, excluding separately reportable  procedures.   There was a high probability of clinically significant/life threatening deterioration in the patient's condition which required my urgent intervention.      FINAL IMPRESSION      1. COVID-19    2. Shortness of breath    3. History of atrial fibrillation    4. BMI 40.0-44.9, adult (HCC)          DISPOSITION/PLAN     ED Disposition     ED Disposition   Discharge    Condition   Stable    Comment   --             PATIENT REFERRED TO:  Hakan Pena MD  [address] [address]  [phone]    Schedule an appointment as soon as possible for a visit in 2 days      [facility] Emergency Department  [address] [address] [phone]    If symptoms worsen      DISCHARGE MEDICATIONS:     Medication List      CONTINUE taking these medications    apixaban 5 MG tablet tablet  Commonly known as: ELIQUIS     azelastine 0.1 % nasal spray  Commonly known as: ASTELIN     dilTIAZem 360 MG 24 hr capsule  Commonly known as: TIAZAC     fluticasone 50 MCG/ACT nasal spray  Commonly known as: FLONASE     fluticasone-salmeterol 500-50 MCG/DOSE DISKUS  Commonly known as: ADVAIR  Inhale 1 puff 2 (Two) Times a Day.     furosemide 40 MG tablet  Commonly known as: LASIX  Take 1.5 tablets by mouth Daily.     levocetirizine 5 MG tablet  Commonly known as: XYZAL     losartan 25 MG tablet  Commonly known as: COZAAR     metFORMIN  MG 24 hr tablet  Commonly known as: GLUCOPHAGE-XR     omeprazole 20 MG capsule  Commonly known as: priLOSEC     simvastatin 40 MG tablet  Commonly known as: ZOCOR     triamcinolone 0.1 % cream  Commonly known as: KENALOG                Comment: Please note this report has been produced using speech recognition software.      Shabbir Dixon MD  Attending Emergency Physician               Shabbir Dixon MD  12/11/22 4799

## 2022-12-12 LAB
QT INTERVAL: 454 MS
QT INTERVAL: 454 MS
QTC INTERVAL: 513 MS
QTC INTERVAL: 513 MS

## 2023-01-17 ENCOUNTER — OFFICE VISIT (OUTPATIENT)
Dept: PULMONOLOGY | Facility: CLINIC | Age: 73
End: 2023-01-17
Payer: COMMERCIAL

## 2023-01-17 ENCOUNTER — OFFICE VISIT (OUTPATIENT)
Dept: SLEEP MEDICINE | Facility: HOSPITAL | Age: 73
End: 2023-01-17
Payer: COMMERCIAL

## 2023-01-17 VITALS
DIASTOLIC BLOOD PRESSURE: 90 MMHG | BODY MASS INDEX: 40.55 KG/M2 | HEART RATE: 93 BPM | RESPIRATION RATE: 18 BRPM | TEMPERATURE: 96.8 F | OXYGEN SATURATION: 96 % | HEIGHT: 72 IN | SYSTOLIC BLOOD PRESSURE: 136 MMHG | WEIGHT: 299.38 LBS

## 2023-01-17 VITALS
HEIGHT: 72 IN | WEIGHT: 299 LBS | DIASTOLIC BLOOD PRESSURE: 67 MMHG | HEART RATE: 82 BPM | BODY MASS INDEX: 40.5 KG/M2 | OXYGEN SATURATION: 96 % | SYSTOLIC BLOOD PRESSURE: 141 MMHG

## 2023-01-17 DIAGNOSIS — J98.4 RESTRICTIVE LUNG DISEASE: ICD-10-CM

## 2023-01-17 DIAGNOSIS — J45.40 MODERATE PERSISTENT ASTHMA WITHOUT COMPLICATION: Primary | ICD-10-CM

## 2023-01-17 DIAGNOSIS — I48.3 TYPICAL ATRIAL FLUTTER: ICD-10-CM

## 2023-01-17 DIAGNOSIS — G47.33 OSA (OBSTRUCTIVE SLEEP APNEA): Primary | ICD-10-CM

## 2023-01-17 DIAGNOSIS — G47.30 SLEEP APNEA IN ADULT: ICD-10-CM

## 2023-01-17 PROCEDURE — 99213 OFFICE O/P EST LOW 20 MIN: CPT | Performed by: NURSE PRACTITIONER

## 2023-01-17 PROCEDURE — 99214 OFFICE O/P EST MOD 30 MIN: CPT | Performed by: INTERNAL MEDICINE

## 2023-01-17 RX ORDER — LOSARTAN POTASSIUM 50 MG/1
50 TABLET ORAL DAILY
COMMUNITY
Start: 2022-10-20

## 2023-01-17 RX ORDER — ALBUTEROL SULFATE 90 UG/1
2 AEROSOL, METERED RESPIRATORY (INHALATION) EVERY 4 HOURS PRN
Qty: 18 G | Refills: 11 | Status: SHIPPED | OUTPATIENT
Start: 2023-01-17

## 2023-01-17 RX ORDER — DOFETILIDE 0.25 MG/1
250 CAPSULE ORAL 2 TIMES DAILY
COMMUNITY
Start: 2023-01-16

## 2023-01-17 RX ORDER — POTASSIUM CHLORIDE 1500 MG/1
20 TABLET, FILM COATED, EXTENDED RELEASE ORAL 2 TIMES DAILY
COMMUNITY
Start: 2022-10-20

## 2023-01-17 RX ORDER — CYCLOBENZAPRINE HCL 5 MG
5 TABLET ORAL 2 TIMES DAILY PRN
COMMUNITY
Start: 2023-01-08

## 2023-01-17 RX ORDER — BUDESONIDE AND FORMOTEROL FUMARATE DIHYDRATE 160; 4.5 UG/1; UG/1
2 AEROSOL RESPIRATORY (INHALATION) 2 TIMES DAILY
Qty: 1 EACH | Refills: 11 | Status: SHIPPED | OUTPATIENT
Start: 2023-01-17

## 2023-01-17 NOTE — PROGRESS NOTES
"Pulmonary Office Follow Up      Subjective   Chief Complaint: Shortness of Breath    Elvin Smith is a 72 y.o. male is being seen in follow up for Asthma    History of Present Illness    Mr. Smith is a 73yo M who is followed for asthma. He was last seen in clinic on 3/15/22.    Since his last visit, he was recently seen in the ED on 12/11/22 for shortness of breath and upper respiratory tract infection. He was positive for COVID-19 at that time. Imaging was negative for pneumonia. His vital signs were stable and he was discharged home from the ED.     He returns to clinic today for follow up. He has stopped using all inhaler therapy. He reports that he did not like Advair as it \"tasted bad\" and was affecting his sense of taste. He is also not using Albuterol. He continues to have shortness of breath. He is following at  for Afib and notes that he was back in AFib after COVID. He is now back in NSR on Tikosyn.     The following portions of the patient's history were reviewed and updated as appropriate: allergies, current medications, past family history, past medical history, past social history, past surgical history and problem list.    Review of Systems   Constitutional: Negative.    HENT: Negative.    Eyes: Negative.    Respiratory: Positive for cough and shortness of breath.    Cardiovascular: Negative.    Gastrointestinal: Negative.    Endocrine: Negative.    Genitourinary: Negative.    Musculoskeletal: Negative.    Skin: Negative.    Allergic/Immunologic: Negative.    Neurological: Negative.    Hematological: Negative.    Psychiatric/Behavioral: Negative.          Objective   Blood pressure 136/90, pulse 93, temperature 96.8 °F (36 °C), resp. rate 18, height 182.9 cm (72.01\"), weight 136 kg (299 lb 6 oz), SpO2 96 %.  Physical Exam  Vitals and nursing note reviewed.   Constitutional:       General: He is not in acute distress.     Appearance: He is well-developed. He is obese.   HENT:      Head: " Normocephalic and atraumatic.   Eyes:      General: No scleral icterus.     Conjunctiva/sclera: Conjunctivae normal.      Pupils: Pupils are equal, round, and reactive to light.   Neck:      Thyroid: No thyromegaly.      Trachea: No tracheal deviation.   Cardiovascular:      Rate and Rhythm: Normal rate and regular rhythm.      Heart sounds: Normal heart sounds.   Pulmonary:      Effort: Pulmonary effort is normal. No respiratory distress.      Breath sounds: Examination of the right-lower field reveals rales. Examination of the left-lower field reveals rales. Rales present.   Abdominal:      General: Bowel sounds are normal.      Palpations: Abdomen is soft.      Tenderness: There is no abdominal tenderness.   Musculoskeletal:         General: Normal range of motion.      Cervical back: Normal range of motion and neck supple.   Lymphadenopathy:      Cervical: No cervical adenopathy.   Skin:     General: Skin is warm and dry.      Findings: No erythema or rash.   Neurological:      Mental Status: He is alert and oriented to person, place, and time.      Motor: No abnormal muscle tone.      Coordination: Coordination normal.   Psychiatric:         Speech: Speech normal.         Behavior: Behavior normal.         Judgment: Judgment normal.         PFTs:  No new PFTs.     Imaging:  Chest xray from 12/11/22 reviewed. There are mild chronic interstitial changes of the lung fields without evidence of acute cardiopulmonary abnormality.     Assessment & Plan   Diagnoses and all orders for this visit:    1. Moderate persistent asthma without complication (Primary)    2. Restrictive lung disease    3. Typical atrial flutter (HCC)    4. Sleep apnea in adult    Other orders  -     budesonide-formoterol (SYMBICORT) 160-4.5 MCG/ACT inhaler; Inhale 2 puffs 2 (Two) Times a Day.  Dispense: 1 each; Refill: 11  -     albuterol sulfate  (90 Base) MCG/ACT inhaler; Inhale 2 puffs Every 4 (Four) Hours As Needed for Wheezing.   Dispense: 18 g; Refill: 11        Discussion:  Mr. Smith is a 71yo M who is followed for asthma.      1. Asthma  - His PFTs are indicative of asthma as he only smoked for approximately 2 years and has a significant response to bronchodilators.   -Change Advair 500/50 to Symbicort 160/4.5. I have sent an Rx. I have asked him to call if the cost is prohibitive and we will try and send in another inhaler from the same class which is not a dry powder inhaler.   - I have given him a spacer to use with his inhalers and explained how to use this.   - PRN Albuterol. I have sent in a new Rx and explained when he should use his rescue inhaler.   - Plan for Full PFTs at his next visit.      2. Restrictive Lung Disease  - Likely secondary to chronically elevated left hemidiaphragm and obesity.   - CTA negative for pulmonary fibrosis.   - He is currently working with a nutritionist to try and lose weight.   - PFTs at next visit to monitor.      3. Atrial Fibrillation  - S/p Ablation on 2/23/22.   - Following with [facility] and now on Tikosyn.      4. KM  - Continue Cpap therapy.   - Follows with Sleep Medicine later this morning.     Follow up in 3 months with Full PFTs. Call with any questions or concerns.       Lucretia MCDUFFIE Case, DO  Pulmonary and Critical Care Medicine  Note Electronically Signed

## 2023-01-17 NOTE — PROGRESS NOTES
Chief Complaint:   Chief Complaint   Patient presents with   • Follow-up       HPI:    Elvin Smith is a 72 y.o. male here for follow-up of sleep apnea.  Patient has a history of atrial fibrillation, hyperlipidemia, hypertension, long-term use of anticoagulants, BPH, restrictive lung disease, moderate persistent asthma without complication, and sleep apnea.  Patient was last seen 1/18/2022.  We are having difficulty today getting a download patient states it does go into a very high pressure that does bother him and will awaken at least 3 times during the night due to his discomfort.  He also states his mouth is very dry and tongue is becoming irritated due to dryness.  I have reached out to the [employer] rep while in the patient room and did leave a message for her to return my call.  Patient did by his 3 machines 1 for [place] and 1 [place] house all on line without using DME.  I doubt we will be able to get any sort of download regarding these.  He did bring his SIM cards with him today and these were showing no data.  We will move forward with order going through [employer] [facility] for new machine.        Current medications are:   Current Outpatient Medications:   •  albuterol sulfate  (90 Base) MCG/ACT inhaler, Inhale 2 puffs Every 4 (Four) Hours As Needed for Wheezing., Disp: 18 g, Rfl: 11  •  apixaban (ELIQUIS) 5 MG tablet tablet, Take 5 mg by mouth Every 12 (Twelve) Hours., Disp: , Rfl:   •  azelastine (ASTELIN) 0.1 % nasal spray, 2 sprays into the nostril(s) as directed by provider As Needed. Use in each nostril as directed, Disp: , Rfl:   •  budesonide-formoterol (SYMBICORT) 160-4.5 MCG/ACT inhaler, Inhale 2 puffs 2 (Two) Times a Day., Disp: 1 each, Rfl: 11  •  cyclobenzaprine (FLEXERIL) 5 MG tablet, Take 5 mg by mouth 2 (Two) Times a Day As Needed., Disp: , Rfl:   •  dilTIAZem (TIAZAC) 360 MG 24 hr capsule, Take 360 mg by mouth Daily., Disp: , Rfl:   •  dofetilide  (TIKOSYN) 250 MCG capsule, Take 250 mcg by mouth 2 (Two) Times a Day., Disp: , Rfl:   •  fluticasone (FLONASE) 50 MCG/ACT nasal spray, 2 sprays into the nostril(s) as directed by provider As Needed for Rhinitis., Disp: , Rfl:   •  furosemide (LASIX) 40 MG tablet, Take 1.5 tablets by mouth Daily., Disp: 135 tablet, Rfl: 2  •  levocetirizine (XYZAL) 5 MG tablet, Take 5 mg by mouth Every Evening., Disp: , Rfl:   •  losartan (COZAAR) 50 MG tablet, Take 50 mg by mouth Daily., Disp: , Rfl:   •  metFORMIN ER (GLUCOPHAGE-XR) 500 MG 24 hr tablet, Take 1 tablet by mouth Daily., Disp: , Rfl:   •  metoprolol tartrate (LOPRESSOR) 25 MG tablet, Take 25 mg by mouth 2 (Two) Times a Day., Disp: , Rfl:   •  omeprazole (priLOSEC) 20 MG capsule, Take 20 mg by mouth Every Morning., Disp: , Rfl:   •  potassium chloride ER (K-TAB) 20 MEQ tablet controlled-release ER tablet, Take 20 mEq by mouth 2 (Two) Times a Day., Disp: , Rfl:   •  simvastatin (ZOCOR) 40 MG tablet, Take 40 mg by mouth Every Morning., Disp: , Rfl:   •  triamcinolone (KENALOG) 0.1 % cream, Apply 1 application topically to the appropriate area as directed Daily., Disp: , Rfl: .      The patient's relevant past medical, surgical, family and social history were reviewed and updated in Epic as appropriate.       Review of Systems   Respiratory: Positive for apnea, shortness of breath and wheezing.    Cardiovascular: Positive for palpitations.   Gastrointestinal:        Heartburn   Genitourinary: Positive for frequency.   Allergic/Immunologic: Positive for environmental allergies.   Psychiatric/Behavioral: Positive for sleep disturbance.   All other systems reviewed and are negative.        Objective:    Physical Exam  Constitutional:       Appearance: Normal appearance.   HENT:      Head: Normocephalic and atraumatic.   Cardiovascular:      Rate and Rhythm: Normal rate.   Pulmonary:      Effort: Pulmonary effort is normal. No respiratory distress.   Skin:     General: Skin is  "dry.   Neurological:      Mental Status: He is alert and oriented to person, place, and time.   Psychiatric:         Mood and Affect: Mood normal.         Behavior: Behavior normal.         Thought Content: Thought content normal.         Judgment: Judgment normal.       /67   Pulse 82   Ht 182.9 cm (72\")   Wt 136 kg (299 lb)   SpO2 96%   BMI 40.55 kg/m²     CPAP Report    None  The patient continues to use and benefit from CPAP therapy.    ASSESSMENT/PLAN    Diagnoses and all orders for this visit:    1. KM (obstructive sleep apnea) (Primary)  -     PAP Therapy        1. Counseled patient regarding multimodal approach with healthy nutrition, healthy sleep, regular physical activity, social activities, counseling, and medications. Encouraged to practice lateral sleep position. Avoid alcohol and sedatives close to bedtime.  2.   Order for new machine to be sent to [facility] patient will call us once he receives his machine and we will see him back in a 31 to 90-day follow-up I have advised him to please bring his machine with power cord to this appointment so we are able to get a download.  Patient agrees to this plan of care    I spent 15 minutes caring for Elvin on this date of service. This time includes time spent by me in the following activities: preparing for the visit, performing a medically appropriate examination and/or evaluation, counseling and educating the patient/family/caregiver and documenting information in the medical record      I have reviewed the results of my evaluation and impression and discussed my recommendations in detail with the patient.      Signed by  CHARITY Joseph    January 17, 2023      CC: Hakan Pena MD         No ref. provider found      "

## 2023-03-13 RX ORDER — FUROSEMIDE 40 MG/1
TABLET ORAL
Qty: 135 TABLET | Refills: 2 | OUTPATIENT
Start: 2023-03-13

## 2023-05-22 ENCOUNTER — TELEPHONE (OUTPATIENT)
Dept: SLEEP MEDICINE | Facility: HOSPITAL | Age: 73
End: 2023-05-22

## 2023-05-22 NOTE — TELEPHONE ENCOUNTER
Provider: MORALES CHACKO    Caller: RODRICK NAIR    Relationship to Patient: SELF    Reason for Call: PATIENT WOULD LIKE TO SPEAK WITH MORALES CHACKO. HE HAS QUESTIONS AND CONCERNS. PATIENT STATES THAT HE HAS IT WRITTEN DOWN THAT HE HAS AN APPT WITH HER ON 5/24/23. I DON'T SEE APPT WITH HER ON PFT. PLEASE CALL PATIENT. THANK YOU

## 2023-05-24 ENCOUNTER — OFFICE VISIT (OUTPATIENT)
Dept: PULMONOLOGY | Facility: CLINIC | Age: 73
End: 2023-05-24
Payer: MEDICARE

## 2023-05-24 VITALS
SYSTOLIC BLOOD PRESSURE: 120 MMHG | HEIGHT: 72 IN | BODY MASS INDEX: 41.15 KG/M2 | OXYGEN SATURATION: 95 % | DIASTOLIC BLOOD PRESSURE: 80 MMHG | WEIGHT: 303.8 LBS | TEMPERATURE: 98.2 F | HEART RATE: 78 BPM

## 2023-05-24 DIAGNOSIS — J45.40 MODERATE PERSISTENT ASTHMA WITHOUT COMPLICATION: Primary | ICD-10-CM

## 2023-05-24 DIAGNOSIS — G47.30 SLEEP APNEA IN ADULT: ICD-10-CM

## 2023-05-24 DIAGNOSIS — I48.19 PERSISTENT ATRIAL FIBRILLATION: ICD-10-CM

## 2023-05-24 DIAGNOSIS — J98.4 RESTRICTIVE LUNG DISEASE: ICD-10-CM

## 2023-05-24 NOTE — PROGRESS NOTES
"Pulmonary Office Follow Up      Subjective   Chief Complaint: Shortness of Breath    Elvin Smith is a 72 y.o. male is being seen in follow up for Asthma    History of Present Illness    Mr. Smith is a 71yo M who is followed for asthma. He was last seen in clinic on 1/17/23.    Since his last visit, he has stopped using all inhaler therapy as he did not think that it was helping. He continues to have life-limiting dyspnea. He continues to follow with Cardiology and is to be scheduled for a cardiac stress test.     The following portions of the patient's history were reviewed and updated as appropriate: allergies, current medications, past family history, past medical history, past social history, past surgical history and problem list.    Review of Systems   Constitutional: Negative.    HENT: Negative.    Eyes: Negative.    Respiratory: Positive for shortness of breath.    Cardiovascular: Negative.    Gastrointestinal: Negative.    Endocrine: Negative.    Genitourinary: Negative.    Musculoskeletal: Negative.    Skin: Negative.    Allergic/Immunologic: Negative.    Neurological: Negative.    Hematological: Negative.    Psychiatric/Behavioral: Negative.          Objective   Blood pressure 120/80, pulse 78, temperature 98.2 °F (36.8 °C), height 182.9 cm (72\"), weight (!) 138 kg (303 lb 12.8 oz), SpO2 95 %.  Physical Exam  Vitals and nursing note reviewed.   Constitutional:       General: He is not in acute distress.     Appearance: He is well-developed. He is obese.   HENT:      Head: Normocephalic and atraumatic.   Eyes:      General: No scleral icterus.     Conjunctiva/sclera: Conjunctivae normal.      Pupils: Pupils are equal, round, and reactive to light.   Neck:      Thyroid: No thyromegaly.      Trachea: No tracheal deviation.   Cardiovascular:      Rate and Rhythm: Normal rate and regular rhythm.      Heart sounds: Normal heart sounds.   Pulmonary:      Effort: Pulmonary effort is normal. No " respiratory distress.   Abdominal:      General: Bowel sounds are normal.      Palpations: Abdomen is soft.      Tenderness: There is no abdominal tenderness.   Musculoskeletal:         General: Normal range of motion.      Cervical back: Normal range of motion and neck supple.   Lymphadenopathy:      Cervical: No cervical adenopathy.   Skin:     General: Skin is warm and dry.      Findings: No erythema or rash.   Neurological:      Mental Status: He is alert and oriented to person, place, and time.      Motor: No abnormal muscle tone.      Coordination: Coordination normal.   Psychiatric:         Speech: Speech normal.         Behavior: Behavior normal.         Judgment: Judgment normal.         PFTs:  Performed in clinic and personally reviewed.   There is moderate airway obstruction.   There is mild restriction. TLC increased from 4.59L to 5.25L when compared to 2021.   The DLCO is normal.     Imaging:  No new imaging.     Assessment & Plan   Diagnoses and all orders for this visit:    1. Moderate persistent asthma without complication (Primary)  -     Pulmonary Function Test    2. Restrictive lung disease    3. Persistent atrial fibrillation    4. Sleep apnea in adult        Discussion:  Mr. Smith is a 73yo M who is followed for asthma.      1. Asthma  - His PFTs are indicative of asthma as he only smoked for approximately 2 years and has a significant response to bronchodilators.   - He has currently stopped all inhaler therapy.   - I have explained that he does have significant airway obstruction on his pulmonary function testing and we should retry inhalers if he is willing.   - I have given him a sample of Stiolto to use for the next 1 month. I have given him instructions on proper inhaler use. He will call if he thinks that it is helping and if he wants an Rx.      2. Restrictive Lung Disease  - Likely secondary to chronically elevated left hemidiaphragm and obesity.   - CTA negative for pulmonary  fibrosis.   - He is currently working with a nutritionist to try and lose weight.   - PFTs today show improved TLC.      3. Atrial Fibrillation  - S/p Ablation on 2/23/22.   - Following with [facility] and on Tikosyn.      4. KM  - Continue Cpap therapy.   - Follows with Sleep Medicine    Follow up in October. Call with any questions.       Lucretia MCDUFFIE Case, DO  Pulmonary and Critical Care Medicine  Note Electronically Signed

## 2023-05-26 NOTE — PROGRESS NOTES
Sleep Clinic Follow Up Note    Chief Complaint  Follow-up    Subjective     History of Present Illness (from previous encounter on 1/17/2023 with Ms. Candelaria):  Elvin Smith is a 72 y.o. male here for follow-up of sleep apnea.  Patient has a history of atrial fibrillation, hyperlipidemia, hypertension, long-term use of anticoagulants, BPH, restrictive lung disease, moderate persistent asthma without complication, and sleep apnea.  Patient was last seen 1/18/2022.  We are having difficulty today getting a download patient states it does go into a very high pressure that does bother him and will awaken at least 3 times during the night due to his discomfort.  He also states his mouth is very dry and tongue is becoming irritated due to dryness.  I have reached out to the [employer] rep while in the patient room and did leave a message for her to return my call.  Patient did by his 3 machines 1 for his house 1 [place] and 1 for his [place] house all on line without using DME.  I doubt we will be able to get any sort of download regarding these.  He did bring his SIM cards with him today and these were showing no data.  We will move forward with order going through [employer] for new machine. (End copied text).    Interval History:  Elvin Smith is a 72 y.o. male returns for follow up and compliance of CPAP therapy. The patient was last seen on 1/17/2023 with Ms. Maloneyronald. Last visit he was referred to a DME company. He now has medicare and needs a new device and DME company. Overall the patient feels good with regard to therapy. The device appears to be working appropriately. On average the patient sleeps 7 hours per night. The patient wakes 2-3 times per night.     The patient reports the following changes to their medical and medication history since they were last seen:  Stress test coming up  Afib under control    Further details are as follows:      [place] Scale is (out of 24): Total score: 12      Weight:  Current Weight: 306 lb      The patient's relevant past medical, surgical, family, and social history reviewed and updated in Epic as appropriate.    PMH:    Past Medical History:   Diagnosis Date   • Borderline diabetes     working with dietician    • Cancer 05/2019    bladder; surgically removed    • Enlarged prostate     needs surgery once Eliquis can be discontinued    • GERD (gastroesophageal reflux disease)    • History of angioedema 2009    bouts in the last 10 years    • History of MRSA infection 2009    skin; treated with IV antibiotics because having recurring infections for about a year   • Skin cancer     face     Past Surgical History:   Procedure Laterality Date   • ANTERIOR CERVICAL FUSION     • BASAL CELL CARCINOMA EXCISION  03/2021    left ear   • CARDIAC ELECTROPHYSIOLOGY PROCEDURE N/A 5/30/2019    Procedure: Ablation atrial flutter;  Surgeon: Vivek Mercedes MD;  Location:  [facility];  Service: Cardiovascular   • CARDIAC ELECTROPHYSIOLOGY PROCEDURE N/A 2/23/2022    Procedure: PVA (persistent), Rythmia, no meds to hold;  Surgeon: Wintson Clark DO;  Location:  [facility];  Service: Cardiovascular;  Laterality: N/A;   • CARDIOVERSION  2017, 2019    x2   • COLONOSCOPY     • CYSTOSCOPY BLADDER BIOPSY     • EYE SURGERY Bilateral     cataract extraction    • HERNIA REPAIR Bilateral     inguinal    • PROSTATE SURGERY     • SKIN BIOPSY      skin cancer removed from face        No Known Allergies    MEDS:  Prior to Admission medications    Medication Sig Start Date End Date Taking? Authorizing Provider   albuterol sulfate  (90 Base) MCG/ACT inhaler Inhale 2 puffs Every 4 (Four) Hours As Needed for Wheezing. 1/17/23   Case, Lucretia LEZAMA DO   apixaban (ELIQUIS) 5 MG tablet tablet Take 1 tablet by mouth Every 12 (Twelve) Hours.    Provider, MD Katelyn   azelastine (ASTELIN) 0.1 % nasal spray 2 sprays into the nostril(s) as directed by provider As  "Needed. Use in each nostril as directed    Katelyn North MD   cyclobenzaprine (FLEXERIL) 5 MG tablet Take 1 tablet by mouth 2 (Two) Times a Day As Needed. 1/8/23   Katelyn North MD   dofetilide (TIKOSYN) 250 MCG capsule Take 1 capsule by mouth 2 (Two) Times a Day. 1/16/23   Katelyn North MD   fluticasone (FLONASE) 50 MCG/ACT nasal spray 2 sprays into the nostril(s) as directed by provider As Needed for Rhinitis.    Katelyn North MD   levocetirizine (XYZAL) 5 MG tablet Take 1 tablet by mouth Every Evening. 3/13/22   Katelyn North MD   metFORMIN ER (GLUCOPHAGE-XR) 500 MG 24 hr tablet Take 1 tablet by mouth Daily. 5/29/22   Katelyn North MD   metoprolol tartrate (LOPRESSOR) 25 MG tablet Take 1 tablet by mouth 2 (Two) Times a Day. 10/20/22   Katelyn North MD   omeprazole (priLOSEC) 20 MG capsule Take 1 capsule by mouth Every Morning.    Katelyn North MD   potassium chloride ER (K-TAB) 20 MEQ tablet controlled-release ER tablet Take 1 tablet by mouth 2 (Two) Times a Day. 10/20/22   Katelyn North MD   sacubitril-valsartan (ENTRESTO) 24-26 MG tablet Take 1 tablet by mouth 2 (Two) Times a Day.    Katelyn North MD   simvastatin (ZOCOR) 40 MG tablet Take 1 tablet by mouth Every Morning.    Katelyn North MD   triamcinolone (KENALOG) 0.1 % cream Apply 1 application topically to the appropriate area as directed Daily. 5/2/22   Katelyn North MD         FH:  Family History   Problem Relation Age of Onset   • Heart failure Mother    • Cancer Sister    • Alcohol abuse Brother    • No Known Problems Brother        Objective   Vital Signs:  /69 (BP Location: Left arm, Patient Position: Sitting)   Pulse 73   Ht 182.9 cm (72\")   Wt (!) 139 kg (306 lb 12.8 oz)   SpO2 94%   BMI 41.61 kg/m²           Physical Exam  Vitals reviewed.   Constitutional:       Appearance: Normal appearance.   HENT:      Head: Normocephalic and atraumatic. "      Nose: Nose normal.      Mouth/Throat:      Mouth: Mucous membranes are moist.   Cardiovascular:      Rate and Rhythm: Normal rate and regular rhythm.      Heart sounds: No murmur heard.    No friction rub. No gallop.   Pulmonary:      Effort: Pulmonary effort is normal. No respiratory distress.      Breath sounds: Normal breath sounds. No wheezing or rhonchi.   Neurological:      Mental Status: He is alert and oriented to person, place, and time.   Psychiatric:         Behavior: Behavior normal.           Result Review :              Assessment and Plan  Elvin Smiht is a very pleasant 72 y.o. male who returns for follow-up of PAP therapy.  Pap report has been reviewed. I will order a new machine and supplies for him today.  I have noted that he will need to return for follow-up in compliance in 31-90 days after start of new device.    Diagnoses and all orders for this visit:    1. KM (obstructive sleep apnea) (Primary)  -     PAP Therapy    2. Paroxysmal A-fib    3. Morbid obesity             The patient continues to use and benefit from CPAP therapy.    1. The patient was counseled regarding multimodal approach with healthy nutrition, healthy sleep, regular physical activity, social activities, counseling, and medications. Encouraged to practice lateral sleep position. Avoid alcohol and sedatives close to bedtime.     2.  We will refill supplies x1 year.  Return to clinic 1 year or sooner if symptoms warrant. I have reviewed the results of my evaluation and impression and discussed my recommendations in detail with the patient.           Follow Up  Return for 31 to 90 days after PAP setup.  Patient was given instructions and counseling regarding his condition or for health maintenance advice. Please see specific information pulled into the AVS if appropriate.       CHARITY Johnson, ACNP-BC  Pulmonology, Critical Care, and Sleep Medicine

## 2023-05-30 ENCOUNTER — OFFICE VISIT (OUTPATIENT)
Dept: SLEEP MEDICINE | Facility: HOSPITAL | Age: 73
End: 2023-05-30

## 2023-05-30 VITALS
OXYGEN SATURATION: 94 % | HEIGHT: 72 IN | SYSTOLIC BLOOD PRESSURE: 134 MMHG | DIASTOLIC BLOOD PRESSURE: 69 MMHG | BODY MASS INDEX: 41.55 KG/M2 | WEIGHT: 306.8 LBS | HEART RATE: 73 BPM

## 2023-05-30 DIAGNOSIS — I48.0 PAROXYSMAL A-FIB: ICD-10-CM

## 2023-05-30 DIAGNOSIS — E66.01 MORBID OBESITY: ICD-10-CM

## 2023-05-30 DIAGNOSIS — G47.33 OSA (OBSTRUCTIVE SLEEP APNEA): Primary | ICD-10-CM

## 2023-08-14 DIAGNOSIS — J45.40 MODERATE PERSISTENT ASTHMA WITHOUT COMPLICATION: Primary | ICD-10-CM

## 2023-08-14 RX ORDER — BUDESONIDE 0.5 MG/2ML
0.5 INHALANT ORAL
Qty: 120 ML | Refills: 11
Start: 2023-08-14

## 2023-08-14 RX ORDER — FORMOTEROL FUMARATE 20 UG/2ML
20 SOLUTION RESPIRATORY (INHALATION)
Qty: 120 ML | Refills: 11
Start: 2023-08-14

## 2023-08-14 RX ORDER — BUDESONIDE 0.5 MG/2ML
0.5 INHALANT ORAL
Qty: 120 ML | Refills: 11
Start: 2023-08-14 | End: 2023-08-14

## 2023-08-21 ENCOUNTER — TELEPHONE (OUTPATIENT)
Dept: PULMONOLOGY | Facility: CLINIC | Age: 73
End: 2023-08-21
Payer: MEDICARE

## 2023-08-21 NOTE — TELEPHONE ENCOUNTER
Pt called today requesting an apt with Dr Milan to discuss his medications(Perforomist/Pulmicort Neb Sol)that he is not able to get filled or approved. Pt is not happy due to nebs being mailed to his house and not understanding why the [employer] is reaching out to him to give him instructions instead of Dr Milan. Pt also wants to discuss the numerous of inhalers that he has used and nothing is helping and wants to stay on 1 inhaler and not be changing without discussing with him first. Pt transferred to PAR to schedule with Dr Milan only per pt's request.

## 2023-08-28 ENCOUNTER — OFFICE VISIT (OUTPATIENT)
Dept: PULMONOLOGY | Facility: CLINIC | Age: 73
End: 2023-08-28
Payer: MEDICARE

## 2023-08-28 VITALS
TEMPERATURE: 97.5 F | SYSTOLIC BLOOD PRESSURE: 120 MMHG | BODY MASS INDEX: 40.88 KG/M2 | OXYGEN SATURATION: 93 % | WEIGHT: 301.8 LBS | HEART RATE: 81 BPM | DIASTOLIC BLOOD PRESSURE: 78 MMHG | HEIGHT: 72 IN

## 2023-08-28 DIAGNOSIS — J98.4 RESTRICTIVE LUNG DISEASE: ICD-10-CM

## 2023-08-28 DIAGNOSIS — G47.30 SLEEP APNEA IN ADULT: ICD-10-CM

## 2023-08-28 DIAGNOSIS — J45.40 MODERATE PERSISTENT ASTHMA WITHOUT COMPLICATION: Primary | ICD-10-CM

## 2023-08-28 DIAGNOSIS — I48.19 PERSISTENT ATRIAL FIBRILLATION: ICD-10-CM

## 2023-08-28 DIAGNOSIS — R06.02 SHORTNESS OF BREATH: ICD-10-CM

## 2023-08-28 RX ORDER — FLUTICASONE FUROATE AND VILANTEROL 200; 25 UG/1; UG/1
1 POWDER RESPIRATORY (INHALATION)
Qty: 1 EACH | Refills: 11 | Status: SHIPPED | OUTPATIENT
Start: 2023-08-28

## 2023-08-28 NOTE — PROGRESS NOTES
"Pulmonary Office Follow Up      Subjective   Chief Complaint: Shortness of Breath    Elvin Smith is a 72 y.o. male is being seen in follow up for Asthma    History of Present Illness    Mr. Smith is a 71yo M who is followed for asthma. He was last seen in clinic on 5/30/23.    Since his last visit, he was only able to get Symbicort for 1 month as his insurance would not cover this. He was then switched to nebulized medications, however, he does not have a nebulizer.     He is scheduled for what sounds like CPX testing at  soon.     The following portions of the patient's history were reviewed and updated as appropriate: allergies, current medications, past family history, past medical history, past social history, past surgical history and problem list.    Review of Systems   Constitutional:  Positive for fatigue.   HENT: Negative.     Eyes: Negative.    Respiratory:  Positive for shortness of breath.    Cardiovascular: Negative.    Gastrointestinal: Negative.    Endocrine: Negative.    Genitourinary: Negative.    Musculoskeletal: Negative.    Skin: Negative.    Allergic/Immunologic: Negative.    Neurological: Negative.    Hematological: Negative.    Psychiatric/Behavioral: Negative.         Objective   Blood pressure 120/78, pulse 81, temperature 97.5 øF (36.4 øC), temperature source Infrared, height 182.9 cm (72\"), weight (!) 137 kg (301 lb 12.8 oz), SpO2 93 %.  Physical Exam  Vitals and nursing note reviewed.   Constitutional:       General: He is not in acute distress.     Appearance: He is well-developed. He is obese.   HENT:      Head: Normocephalic and atraumatic.   Eyes:      General: No scleral icterus.     Conjunctiva/sclera: Conjunctivae normal.      Pupils: Pupils are equal, round, and reactive to light.   Neck:      Thyroid: No thyromegaly.      Trachea: No tracheal deviation.   Cardiovascular:      Rate and Rhythm: Normal rate and regular rhythm.      Heart sounds: Normal heart sounds. "   Pulmonary:      Effort: Pulmonary effort is normal. No respiratory distress.   Abdominal:      General: Bowel sounds are normal.      Palpations: Abdomen is soft.      Tenderness: There is no abdominal tenderness.   Musculoskeletal:         General: Normal range of motion.      Cervical back: Normal range of motion and neck supple.   Lymphadenopathy:      Cervical: No cervical adenopathy.   Skin:     General: Skin is warm and dry.      Findings: No erythema or rash.   Neurological:      Mental Status: He is alert and oriented to person, place, and time.      Motor: No abnormal muscle tone.      Coordination: Coordination normal.   Psychiatric:         Speech: Speech normal.         Behavior: Behavior normal.         Judgment: Judgment normal.       PFTs:  No new PFTs.    Imaging:  No new imaging.     Assessment & Plan   Diagnoses and all orders for this visit:    1. Moderate persistent asthma without complication (Primary)    2. Restrictive lung disease    3. Persistent atrial fibrillation    4. Sleep apnea in adult    5. Shortness of breath    Other orders  -     Fluticasone Furoate-Vilanterol (BREO ELLIPTA) 200-25 MCG/ACT inhaler; Inhale 1 puff Daily.  Dispense: 1 each; Refill: 11          Discussion:  Mr. Smith is a 73yo M who is followed for asthma.      1. Asthma  - His PFTs are indicative of asthma as he only smoked for approximately 2 years and has a significant response to bronchodilators.   - His insurance appears to cover Breo 200 and I have sent this in. If this is not covered, we will try and send in Air Duo.   - Continue Albuterol as needed.   - We discussed the importance of maintenance inhaler therapy to keep his airway obstruction from worsening, even if he does not note much improvement in his breathing.   - I have recommended Pulmonary Rehab and he will think about this.      2. Restrictive Lung Disease  - Likely secondary to chronically elevated left hemidiaphragm and obesity.   - CTA  negative for pulmonary fibrosis.   - He is currently working with a nutritionist to try and lose weight.   - PFTs in May showed improved TLC.      3. Atrial Fibrillation  - S/p Ablation on 2/23/22.   - Following with [facility] and on Tikosyn.      4. KM  - Continue Cpap therapy.   - Follows with Sleep Medicine    5. Shortness of Breath  - Likely a combination of Asthma, Afib and Deconditioning.   - I have asked him to let us know if his CPX testing indicates a pulmonary problem rather than deconditioning.     Follow up in March. Call with any questions.     Elvin Vo Sarah  reports that he quit smoking about 44 years ago. His smoking use included cigarettes. He has a 3.00 pack-year smoking history. He has never used smokeless tobacco.    Advance Care Planning   ACP discussion was held with the patient during this visit. Patient has an advance directive (not in EMR), copy requested.            Lucretia MCDUFFIE Case, DO  Pulmonary and Critical Care Medicine  Note Electronically Signed

## (undated) DEVICE — SI AVANTI+ 7F STD W/GW  NO OBT: Brand: AVANTI

## (undated) DEVICE — Device: Brand: MEDEX

## (undated) DEVICE — LEX ELECTRO PHYSIOLOGY: Brand: MEDLINE INDUSTRIES, INC.

## (undated) DEVICE — SYS TRNSEP ACC BRK ACROSS A/ 18G 98CM

## (undated) DEVICE — DOME MONITORING W BONDED STPCK BIOTRANS2

## (undated) DEVICE — ELECTRD RETRN/GRND MEGADYNE SGL/PLT W/CORD 9FT DISP

## (undated) DEVICE — CATH ULTRASND ECHOCARDIOGRAPHY ACUNAV

## (undated) DEVICE — ABLATION CATHETER: Brand: INTELLANAV MIFI™ OPEN-IRRIGATED

## (undated) DEVICE — INTRO STEER AGILIS NXT MED/CURL 8.5F

## (undated) DEVICE — PRESSURE MONITORING SET: Brand: TRUWAVE

## (undated) DEVICE — OPEN-IRRIGATION TUBING SET: Brand: METRIQ™ IRRIGATION TUBING SET

## (undated) DEVICE — LOCATION REFERENCE PATCH KIT: Brand: RHYTHMIA™ MAPPING SYSTEM

## (undated) DEVICE — ST INF PRI SMRTSTE 20DRP 2VLV 24ML 117

## (undated) DEVICE — GW TRNSEP SAFESEPT LT/ATRIAL RO 135CM .014IN

## (undated) DEVICE — TEMPERATURE ABLATION CATHETER: Brand: BLAZER® II XP

## (undated) DEVICE — RETR ESOPH ESOSURE W/TEMP/CONTRL NITNL STY 27F

## (undated) DEVICE — STERILE (15.2 TAPERED TO 7.6 X 183CM) POLYETHYLENE ACCORDION-FOLDED COVER FOR USE WITH SIEMENS ACUNAV ULTRASOUND CATHETER FAMILY CONNECTOR: Brand: SWIFTLINK TRANSDUCER COVER

## (undated) DEVICE — AVANTI + 5F STD W/GW: Brand: AVANTI

## (undated) DEVICE — SI AVANTI+ 8F STD W/GW  NO OBT: Brand: AVANTI

## (undated) DEVICE — INTRO SHEATH TRNSEP .032 SL0 8.5F 63CM

## (undated) DEVICE — Device: Brand: WEBSTER CS

## (undated) DEVICE — ADULT, W/LG. BACK PAD, RADIOTRANSPARENT ELEMENT AND LEAD WIRE: Brand: DEFIBRILLATION ELECTRODES

## (undated) DEVICE — CANN NASL CO2 DIVIDED A/

## (undated) DEVICE — DECANTER: Brand: UNBRANDED

## (undated) DEVICE — INTRO STEER AGILIS NXT SM/CURL 8.5F

## (undated) DEVICE — CATH EP SUPRM QUADPOLAR JSN 5F 5MM 120CM

## (undated) DEVICE — HIGH RESOLUTION MAPPING CATHETER: Brand: INTELLAMAP ORION™

## (undated) DEVICE — INTRO SHEATH FAST/CATH LG/LUM 11F .038IN 12CM

## (undated) DEVICE — LIMB HOLDER, WRIST/ANKLE: Brand: DEROYAL

## (undated) DEVICE — PROB S-CATH TEMP ESOPH 10F

## (undated) DEVICE — KT MANIFLD EP

## (undated) DEVICE — SOL NACL 0.9PCT 1000ML

## (undated) DEVICE — SYS TRNSEP ACC BRK ACROSS A/ 71CM

## (undated) DEVICE — CATH EP INQUIRY H CRV 7F 1-7-1MM 110CM

## (undated) DEVICE — ST EXT IV LG BORE NDLESS FLTR LL 17IN

## (undated) DEVICE — INTRO SHEATH ENGAGE W/50 GW .038 8F12

## (undated) DEVICE — DECANT BG O JET

## (undated) DEVICE — ST EXT IV SMARTSITE 2VLV SP M LL 5ML IV1

## (undated) DEVICE — SET PRIMARY GRVTY 10DP MALE LL 104IN

## (undated) DEVICE — DRSNG SURESITE123 4X4.8IN

## (undated) DEVICE — INTRO SHEATH ART/FEM ENGAGE .038 6F12CM

## (undated) DEVICE — SYS CLS VASC/VENI VASCADE MVP 6TO12F

## (undated) DEVICE — SYRINGE,PISTON,IRRIGATION,60ML,STERILE: Brand: MEDLINE